# Patient Record
Sex: FEMALE | Race: WHITE | NOT HISPANIC OR LATINO | Employment: OTHER | URBAN - METROPOLITAN AREA
[De-identification: names, ages, dates, MRNs, and addresses within clinical notes are randomized per-mention and may not be internally consistent; named-entity substitution may affect disease eponyms.]

---

## 2017-09-11 ENCOUNTER — CONVERSION ENCOUNTER (OUTPATIENT)
Dept: GENERAL RADIOLOGY | Facility: HOSPITAL | Age: 62
End: 2017-09-11

## 2019-02-28 ENCOUNTER — HOSPITAL ENCOUNTER (OUTPATIENT)
Dept: GENERAL RADIOLOGY | Facility: HOSPITAL | Age: 64
Discharge: HOME | End: 2019-02-28
Attending: INTERNAL MEDICINE

## 2020-07-01 ENCOUNTER — HOSPITAL ENCOUNTER (OUTPATIENT)
Dept: GENERAL RADIOLOGY | Facility: HOSPITAL | Age: 65
Discharge: HOME | End: 2020-07-01
Attending: INTERNAL MEDICINE

## 2021-03-18 ENCOUNTER — HOSPITAL ENCOUNTER (OUTPATIENT)
Dept: VACCINE CLINIC | Facility: HOSPITAL | Age: 66
Discharge: HOME | End: 2021-03-18
Attending: INTERNAL MEDICINE

## 2021-04-08 ENCOUNTER — HOSPITAL ENCOUNTER (OUTPATIENT)
Dept: VACCINE CLINIC | Facility: HOSPITAL | Age: 66
Discharge: HOME | End: 2021-04-08
Attending: INTERNAL MEDICINE

## 2021-12-07 ENCOUNTER — OFFICE VISIT (OUTPATIENT)
Dept: FAMILY MEDICINE CLINIC | Facility: CLINIC | Age: 66
End: 2021-12-07

## 2021-12-07 VITALS
OXYGEN SATURATION: 98 % | BODY MASS INDEX: 34.06 KG/M2 | TEMPERATURE: 97.4 F | DIASTOLIC BLOOD PRESSURE: 78 MMHG | WEIGHT: 185.1 LBS | HEIGHT: 62 IN | HEART RATE: 67 BPM | SYSTOLIC BLOOD PRESSURE: 118 MMHG

## 2021-12-07 DIAGNOSIS — E78.5 HYPERLIPIDEMIA, UNSPECIFIED HYPERLIPIDEMIA TYPE: ICD-10-CM

## 2021-12-07 DIAGNOSIS — G89.29 CHRONIC PAIN OF BOTH KNEES: ICD-10-CM

## 2021-12-07 DIAGNOSIS — Z85.41 HISTORY OF CERVICAL CANCER: ICD-10-CM

## 2021-12-07 DIAGNOSIS — Z12.31 VISIT FOR SCREENING MAMMOGRAM: ICD-10-CM

## 2021-12-07 DIAGNOSIS — Z51.81 MEDICATION MONITORING ENCOUNTER: ICD-10-CM

## 2021-12-07 DIAGNOSIS — Z78.0 POSTMENOPAUSAL: ICD-10-CM

## 2021-12-07 DIAGNOSIS — M25.561 CHRONIC PAIN OF BOTH KNEES: ICD-10-CM

## 2021-12-07 DIAGNOSIS — Z23 NEED FOR INFLUENZA VACCINATION: ICD-10-CM

## 2021-12-07 DIAGNOSIS — I10 PRIMARY HYPERTENSION: Primary | ICD-10-CM

## 2021-12-07 DIAGNOSIS — M25.562 CHRONIC PAIN OF BOTH KNEES: ICD-10-CM

## 2021-12-07 PROBLEM — Z12.11 SCREENING FOR COLON CANCER: Status: ACTIVE | Noted: 2021-12-07

## 2021-12-07 LAB
ALBUMIN SERPL-MCNC: 4.8 G/DL (ref 3.5–5.2)
ALBUMIN/GLOB SERPL: 1.6 G/DL
ALP SERPL-CCNC: 98 U/L (ref 39–117)
ALT SERPL W P-5'-P-CCNC: 20 U/L (ref 1–33)
ANION GAP SERPL CALCULATED.3IONS-SCNC: 10.3 MMOL/L (ref 5–15)
AST SERPL-CCNC: 15 U/L (ref 1–32)
BASOPHILS # BLD AUTO: 0.05 10*3/MM3 (ref 0–0.2)
BASOPHILS NFR BLD AUTO: 0.5 % (ref 0–1.5)
BILIRUB SERPL-MCNC: 0.2 MG/DL (ref 0–1.2)
BUN SERPL-MCNC: 16 MG/DL (ref 8–23)
BUN/CREAT SERPL: 20.8 (ref 7–25)
CALCIUM SPEC-SCNC: 10.3 MG/DL (ref 8.6–10.5)
CHLORIDE SERPL-SCNC: 103 MMOL/L (ref 98–107)
CHOLEST SERPL-MCNC: 289 MG/DL (ref 0–200)
CO2 SERPL-SCNC: 27.7 MMOL/L (ref 22–29)
CREAT SERPL-MCNC: 0.77 MG/DL (ref 0.57–1)
DEPRECATED RDW RBC AUTO: 40.7 FL (ref 37–54)
EOSINOPHIL # BLD AUTO: 0.32 10*3/MM3 (ref 0–0.4)
EOSINOPHIL NFR BLD AUTO: 2.9 % (ref 0.3–6.2)
ERYTHROCYTE [DISTWIDTH] IN BLOOD BY AUTOMATED COUNT: 12.3 % (ref 12.3–15.4)
GFR SERPL CREATININE-BSD FRML MDRD: 75 ML/MIN/1.73
GLOBULIN UR ELPH-MCNC: 3 GM/DL
GLUCOSE SERPL-MCNC: 92 MG/DL (ref 65–99)
HCT VFR BLD AUTO: 37.6 % (ref 34–46.6)
HDLC SERPL-MCNC: 49 MG/DL (ref 40–60)
HGB BLD-MCNC: 12.8 G/DL (ref 12–15.9)
IMM GRANULOCYTES # BLD AUTO: 0.05 10*3/MM3 (ref 0–0.05)
IMM GRANULOCYTES NFR BLD AUTO: 0.5 % (ref 0–0.5)
LDLC SERPL CALC-MCNC: 194 MG/DL (ref 0–100)
LDLC/HDLC SERPL: 3.93 {RATIO}
LYMPHOCYTES # BLD AUTO: 1.96 10*3/MM3 (ref 0.7–3.1)
LYMPHOCYTES NFR BLD AUTO: 17.9 % (ref 19.6–45.3)
MCH RBC QN AUTO: 30.7 PG (ref 26.6–33)
MCHC RBC AUTO-ENTMCNC: 34 G/DL (ref 31.5–35.7)
MCV RBC AUTO: 90.2 FL (ref 79–97)
MONOCYTES # BLD AUTO: 0.89 10*3/MM3 (ref 0.1–0.9)
MONOCYTES NFR BLD AUTO: 8.1 % (ref 5–12)
NEUTROPHILS NFR BLD AUTO: 7.68 10*3/MM3 (ref 1.7–7)
NEUTROPHILS NFR BLD AUTO: 70.1 % (ref 42.7–76)
NRBC BLD AUTO-RTO: 0 /100 WBC (ref 0–0.2)
PLATELET # BLD AUTO: 430 10*3/MM3 (ref 140–450)
PMV BLD AUTO: 9.3 FL (ref 6–12)
POTASSIUM SERPL-SCNC: 4 MMOL/L (ref 3.5–5.2)
PROT SERPL-MCNC: 7.8 G/DL (ref 6–8.5)
RBC # BLD AUTO: 4.17 10*6/MM3 (ref 3.77–5.28)
SODIUM SERPL-SCNC: 141 MMOL/L (ref 136–145)
TRIGL SERPL-MCNC: 237 MG/DL (ref 0–150)
VLDLC SERPL-MCNC: 46 MG/DL (ref 5–40)
WBC NRBC COR # BLD: 10.95 10*3/MM3 (ref 3.4–10.8)

## 2021-12-07 PROCEDURE — 80061 LIPID PANEL: CPT | Performed by: FAMILY MEDICINE

## 2021-12-07 PROCEDURE — G0008 ADMIN INFLUENZA VIRUS VAC: HCPCS | Performed by: FAMILY MEDICINE

## 2021-12-07 PROCEDURE — 85025 COMPLETE CBC W/AUTO DIFF WBC: CPT | Performed by: FAMILY MEDICINE

## 2021-12-07 PROCEDURE — 99204 OFFICE O/P NEW MOD 45 MIN: CPT | Performed by: FAMILY MEDICINE

## 2021-12-07 PROCEDURE — 90662 IIV NO PRSV INCREASED AG IM: CPT | Performed by: FAMILY MEDICINE

## 2021-12-07 PROCEDURE — 80053 COMPREHEN METABOLIC PANEL: CPT | Performed by: FAMILY MEDICINE

## 2021-12-07 RX ORDER — LISINOPRIL AND HYDROCHLOROTHIAZIDE 12.5; 1 MG/1; MG/1
1 TABLET ORAL DAILY
Qty: 90 TABLET | Refills: 2 | Status: SHIPPED | OUTPATIENT
Start: 2021-12-07 | End: 2022-06-14 | Stop reason: SDUPTHER

## 2021-12-07 RX ORDER — LISINOPRIL AND HYDROCHLOROTHIAZIDE 12.5; 1 MG/1; MG/1
TABLET ORAL
COMMUNITY
End: 2021-12-07 | Stop reason: SDUPTHER

## 2021-12-07 RX ORDER — MULTIPLE VITAMINS W/ MINERALS TAB 9MG-400MCG
TAB ORAL
COMMUNITY

## 2021-12-07 RX ORDER — IBUPROFEN 200 MG
CAPSULE ORAL
COMMUNITY

## 2021-12-07 RX ORDER — SODIUM PHOSPHATE,MONO-DIBASIC 19G-7G/118
ENEMA (ML) RECTAL
COMMUNITY

## 2021-12-07 NOTE — PROGRESS NOTES
"Chief Complaint  Establish care  Hypertension  hld      Subjective          Pily Rice presents to [facility]  History of Present Illness  Patient presents today to establish care for myself.  She was previously seeing Dr. Shamir Justin.  I will request records.  She has been treated for hypertension over the past several years.  She reports being well controlled on lisinopril/hydrochlorothiazide 10/12.5 mg daily.  She does take calcium supplementation daily as well as glucosamine/conjoint and and a multivitamin.  He was diagnosed with cervical cancer 1988.  She reports having had a hysterectomy and oophorectomy.  She reports that she had a DEXA scan done about 10 years ago.  I discussed with her having a repeat done.  I do not see the records from her prior DEXA scan.  She is due for a mammogram.  Her last mammogram was in July 2020.  BI-RADS 1-benign.  She does report history of high cholesterol.  She was taken cholesterol medication at one point by her former primary care for about 6 months.  Discussed having labs reassess today.  She is requesting a flu vaccination today.  We did discuss Shingrix vaccination.  She reports having shingles about 2 years ago.  She would like to hold off on shingles vaccination at this time but may consider in the future.  Patient does have issues with bilateral knee pain.  This has been chronic for her for about a year.  Denies any recent injury.  She has not had any formal evaluation.  Objective   Vital Signs:   /78   Pulse 67   Temp 97.4 °F (36.3 °C)   Ht 157.5 cm (62\")   Wt 84 kg (185 lb 1.6 oz)   SpO2 98%   BMI 33.86 kg/m²     Physical Exam   General: AAO ×3, no acute distress, pleasant  HEENT: Normocephalic, atraumatic, no discharge in the eyes, no discharge from the nose, no oropharyngeal erythema or exudates, and TMs intact bilaterally with no erythema, no cervical tenderness or lymphadenopathy  Cardiovascular: Regular rate " and rhythm without appreciable murmur  Respiratory: Clear to auscultation bilaterally no RRW  Gastrointestinal: Soft nontender nondistended with bowel sounds present  Musculoskeletal: Both knees demonstrate negative valgus and varus stress testing, negative Saud, negative anterior posterior drawer testing.  She does have some crepitus with active and passive range of motion of her knee joints.  extremities: No edema  Neurologic: CN II through XII grossly intact   Psychiatric: Normal mood and affect  Result Review :                 Assessment and Plan    Diagnoses and all orders for this visit:    1. Primary hypertension (Primary)  -     CBC & Differential  -     Comprehensive Metabolic Panel    2. Medication monitoring encounter  -     CBC & Differential  -     Comprehensive Metabolic Panel  -     Lipid Panel    3. Need for influenza vaccination  -     Fluzone High-Dose 65+yrs (8213-0153)    4. Hyperlipidemia, unspecified hyperlipidemia type  -     Lipid Panel    5. Postmenopausal  -     DEXA Bone Density Axial; Future    6. Visit for screening mammogram  -     Mammo Screening Digital Tomosynthesis Bilateral With CAD; Future    7. History of cervical cancer    8. Chronic pain of both knees    Other orders  -     lisinopril-hydrochlorothiazide (PRINZIDE,ZESTORETIC) 10-12.5 MG per tablet; Take 1 tablet by mouth Daily.  Dispense: 90 tablet; Refill: 2    I discussed with patient getting labs done today.  We discussed continue current antihypertensive management.  Discussed checking lipid profile.  Discussed having a DEXA scan done as she did have her ovaries removed back in 1988.  She reports that she did see hematology/oncology for several years but reports that she had been stable and she no longer had to see hematology/oncology.  As far as her knees are concerned this has been a chronic issue for her.  She is not interested in further evaluation at this time.  I did suggest Voltaren gel which she can get  over-the-counter.  If symptoms continue to persist she is instructed to call or return.  She is considering making an appointment with orthopedics.  She states that her knees are not that bad at this time.  I have ordered a mammogram for her to get done.  Plan to see patient back in 6 months or sooner if needed.  She is instructed to call with any questions or concerns.    Follow Up   Return in about 6 months (around 6/7/2022) for hypertension.  Patient was given instructions and counseling regarding her condition or for health maintenance advice. Please see specific information pulled into the AVS if appropriate.

## 2022-02-07 ENCOUNTER — TELEPHONE (OUTPATIENT)
Dept: FAMILY MEDICINE CLINIC | Facility: CLINIC | Age: 67
End: 2022-02-07

## 2022-02-07 NOTE — TELEPHONE ENCOUNTER
LEFT MESSAGE THAT WE NEED TO CANCEL HER APPT ON 6/13, ASKED PT TO CALL THE OFFICE SO WE CAN RESCHEDULE HER APPT

## 2022-02-14 ENCOUNTER — HOSPITAL ENCOUNTER (OUTPATIENT)
Dept: BONE DENSITY | Facility: HOSPITAL | Age: 67
Discharge: HOME | End: 2022-02-14

## 2022-02-14 ENCOUNTER — HOSPITAL ENCOUNTER (OUTPATIENT)
Dept: MAMMOGRAPHY | Facility: HOSPITAL | Age: 67
Discharge: HOME | End: 2022-02-14

## 2022-02-14 DIAGNOSIS — Z78.0 POSTMENOPAUSAL: ICD-10-CM

## 2022-02-14 DIAGNOSIS — Z12.31 VISIT FOR SCREENING MAMMOGRAM: ICD-10-CM

## 2022-02-14 PROCEDURE — 77063 BREAST TOMOSYNTHESIS BI: CPT

## 2022-02-14 PROCEDURE — 77067 SCR MAMMO BI INCL CAD: CPT

## 2022-02-14 PROCEDURE — 77080 DXA BONE DENSITY AXIAL: CPT

## 2022-02-16 ENCOUNTER — TELEPHONE (OUTPATIENT)
Dept: FAMILY MEDICINE CLINIC | Facility: CLINIC | Age: 67
End: 2022-02-16

## 2022-02-16 NOTE — TELEPHONE ENCOUNTER
CALLED PT / NO ANSWER / LEFT MESSAGE THAT WE NEED TO RESCHEDULE HER APPT ON 6/13. ASKED FOR HER TO CALL THE OFFICE AND LEFT OUR NUMBER

## 2022-02-21 ENCOUNTER — TELEPHONE (OUTPATIENT)
Dept: FAMILY MEDICINE CLINIC | Facility: CLINIC | Age: 67
End: 2022-02-21

## 2022-06-14 ENCOUNTER — OFFICE VISIT (OUTPATIENT)
Dept: FAMILY MEDICINE CLINIC | Facility: CLINIC | Age: 67
End: 2022-06-14

## 2022-06-14 VITALS
WEIGHT: 180.7 LBS | OXYGEN SATURATION: 95 % | DIASTOLIC BLOOD PRESSURE: 72 MMHG | HEIGHT: 62 IN | TEMPERATURE: 98.1 F | HEART RATE: 86 BPM | SYSTOLIC BLOOD PRESSURE: 128 MMHG | BODY MASS INDEX: 33.25 KG/M2

## 2022-06-14 DIAGNOSIS — Z78.0 POSTMENOPAUSAL: ICD-10-CM

## 2022-06-14 DIAGNOSIS — I10 PRIMARY HYPERTENSION: Primary | ICD-10-CM

## 2022-06-14 DIAGNOSIS — Z51.81 MEDICATION MONITORING ENCOUNTER: ICD-10-CM

## 2022-06-14 DIAGNOSIS — E78.5 HYPERLIPIDEMIA, UNSPECIFIED HYPERLIPIDEMIA TYPE: ICD-10-CM

## 2022-06-14 DIAGNOSIS — M85.80 OSTEOPENIA, UNSPECIFIED LOCATION: ICD-10-CM

## 2022-06-14 PROCEDURE — 99214 OFFICE O/P EST MOD 30 MIN: CPT | Performed by: FAMILY MEDICINE

## 2022-06-14 RX ORDER — ATORVASTATIN CALCIUM 10 MG/1
10 TABLET, FILM COATED ORAL DAILY
Qty: 90 TABLET | Refills: 3 | Status: SHIPPED | OUTPATIENT
Start: 2022-06-14 | End: 2023-01-05 | Stop reason: SDUPTHER

## 2022-06-14 RX ORDER — LISINOPRIL AND HYDROCHLOROTHIAZIDE 12.5; 1 MG/1; MG/1
1 TABLET ORAL DAILY
Qty: 90 TABLET | Refills: 3 | Status: SHIPPED | OUTPATIENT
Start: 2022-06-14 | End: 2022-10-13 | Stop reason: SDUPTHER

## 2022-06-14 NOTE — PROGRESS NOTES
"Chief Complaint  Hypertension  Hyperlipidemia    Subjective        Pily Rice presents to [facility]  History of Present Illness  Patient presents today to follow-up for hypertension.  Blood pressure has been adequately controlled taking lisinopril/hydrochlorothiazide 10/12.5 mg daily.  We did review the labs from her last visit.  Her LDL is significantly elevated at 194.  We did discuss that per ACC/AHA calculator guidelines she does have an 11.2% risk of heart disease or stroke in the next 10 years.  We discussed a trial of atorvastatin to take at the 10 mg dosage.  She previously did have some muscle aches taking statins.  I did discuss with her that she may even consider taking the medication every other day until she is able to tolerate it better.  She is agreeable to doing a trial of that again.  We did discuss labs to be repeated prior to her next appointment.  She did have a DEXA scan done that showed osteopenia.  She is taking calcium and vitamin D supplementation.  Objective   Vital Signs:  /72   Pulse 86   Temp 98.1 °F (36.7 °C)   Ht 157.5 cm (62\")   Wt 82 kg (180 lb 11.2 oz)   SpO2 95%   BMI 33.05 kg/m²   Estimated body mass index is 33.05 kg/m² as calculated from the following:    Height as of this encounter: 157.5 cm (62\").    Weight as of this encounter: 82 kg (180 lb 11.2 oz).          Physical Exam   General: AAO ×3, no acute distress, pleasant  HEENT: Normocephalic, atraumatic  Cardiovascular: Regular rate and rhythm without appreciable murmur  Respiratory: Clear to auscultation bilaterally no RRW  Gastrointestinal: Soft nontender nondistended with bowel sounds present  extremities: No edema  Neurologic: CN II through XII grossly intact   Psychiatric: Normal mood and affect  Result Review :                Assessment and Plan   Diagnoses and all orders for this visit:    1. Primary hypertension (Primary)  -     CBC & Differential; Future  -     " Comprehensive Metabolic Panel; Future    2. Medication monitoring encounter  -     CBC & Differential; Future  -     Comprehensive Metabolic Panel; Future  -     Lipid Panel; Future    3. Hyperlipidemia, unspecified hyperlipidemia type  -     Lipid Panel; Future    4. Postmenopausal    5. Osteopenia, unspecified location    Other orders  -     lisinopril-hydrochlorothiazide (PRINZIDE,ZESTORETIC) 10-12.5 MG per tablet; Take 1 tablet by mouth Daily.  Dispense: 90 tablet; Refill: 3  -     atorvastatin (Lipitor) 10 MG tablet; Take 1 tablet by mouth Daily.  Dispense: 90 tablet; Refill: 3    Plan as documented above.  We discussed continue current management of hypertension.  We discussed starting atorvastatin for hyperlipidemia.  I discussed with her continuing current management for osteopenia with calcium and vitamin D supplementation.         Follow Up   Return in about 6 months (around 12/14/2022) for hypertension.  Patient was given instructions and counseling regarding her condition or for health maintenance advice. Please see specific information pulled into the AVS if appropriate.

## 2022-07-21 ENCOUNTER — OFFICE VISIT (OUTPATIENT)
Dept: ORTHOPEDIC SURGERY | Facility: CLINIC | Age: 67
End: 2022-07-21

## 2022-07-21 VITALS — HEIGHT: 62 IN | BODY MASS INDEX: 33.13 KG/M2 | WEIGHT: 180 LBS

## 2022-07-21 DIAGNOSIS — M25.561 PAIN IN BOTH KNEES, UNSPECIFIED CHRONICITY: Primary | ICD-10-CM

## 2022-07-21 DIAGNOSIS — M25.562 PAIN IN BOTH KNEES, UNSPECIFIED CHRONICITY: Primary | ICD-10-CM

## 2022-07-21 DIAGNOSIS — M17.0 PRIMARY OSTEOARTHRITIS OF BOTH KNEES: ICD-10-CM

## 2022-07-21 PROCEDURE — 99204 OFFICE O/P NEW MOD 45 MIN: CPT | Performed by: ORTHOPAEDIC SURGERY

## 2022-07-21 RX ORDER — MELOXICAM 15 MG/1
15 TABLET ORAL DAILY
Qty: 90 TABLET | Refills: 0 | Status: SHIPPED | OUTPATIENT
Start: 2022-07-21 | End: 2022-10-20

## 2022-07-21 NOTE — PROGRESS NOTES
"Chief Complaint  Initial Evaluation of the Right Knee and Initial Evaluation of the Left Knee     Subjective      Pily Rice presents to [facility] for an evaluation of bilateral knees. She reports pain in the knees since November of 2020, it started in the left knee. She had swelling and pain during this time. Her right knee followed shortly after, she does not have much swelling in the right knee. She states a week ago she had a lot of swelling. She denies any injury or trauma to the knees.     No Known Allergies     Social History     Socioeconomic History   • Marital status:    Tobacco Use   • Smoking status: Never Smoker   • Smokeless tobacco: Never Used   Vaping Use   • Vaping Use: Never used   Substance and Sexual Activity   • Alcohol use: Never   • Drug use: Never        Review of Systems     Objective   Vital Signs:   Ht 157.5 cm (62\")   Wt 81.6 kg (180 lb)   BMI 32.92 kg/m²       Physical Exam  Constitutional:       Appearance: Normal appearance. Patient is well-developed and normal weight.   HENT:      Head: Normocephalic.      Right Ear: Hearing and external ear normal.      Left Ear: Hearing and external ear normal.      Nose: Nose normal.   Eyes:      Conjunctiva/sclera: Conjunctivae normal.   Cardiovascular:      Rate and Rhythm: Normal rate.   Pulmonary:      Effort: Pulmonary effort is normal.      Breath sounds: No wheezing or rales.   Abdominal:      Palpations: Abdomen is soft.      Tenderness: There is no abdominal tenderness.   Musculoskeletal:      Cervical back: Normal range of motion.   Skin:     Findings: No rash.   Neurological:      Mental Status: Patient is alert and oriented to person, place, and time.   Psychiatric:         Mood and Affect: Mood and affect normal.         Judgment: Judgment normal.       Ortho Exam      RIGHT KNEE: Calf supple, non-tender, no signs of DVT. Dorsal Pedal Pulse 2+, posterior tibialis pulse 2+. Tender medial joint " line. No swelling, skin discoloration or atrophy. Stable to varus/valgus stress. Stable anterior and posterior drawer. Negative Lachman. Skin intact. Flexion to 125 degrees. Full extension.     LEFT KNEE: Good strength to hamstrings, quadriceps, dorsiflexors and plantar flexors. Stable to varus/valgus stress. Stable anterior and posterior drawer. Negative Lachman. Sensation grossly intact. Neurovascular intact.  Dorsal Pedal Pulse 2+, posterior tibialis pulse 2+. Calf soft. Tender medial joint line. Full extension. Flexion to 125 degrees.     Procedures      Imaging Results (Most Recent)     Procedure Component Value Units Date/Time    XR Knee 3 View Left [[id number]] Resulted: 07/21/22 1604     Updated: 07/21/22 1604    XR Knee 3 View Right [[id number]] Resulted: 07/21/22 1604     Updated: 07/21/22 1604           Result Review :     X-Ray Report:  Bilateral knee(s) X-Ray  Indication: Evaluation of bilateral knee pain   AP, Lateral and Standing view(s)  Findings: advancing degenerative changes of bilateral knees, no acute fractures or dislocation. Bilateral lateral tracking patella.   Prior studies available for comparison: no     Assessment and Plan     Diagnoses and all orders for this visit:    1. Pain in both knees, unspecified chronicity (Primary)  -     XR Knee 3 View Left  -     XR Knee 3 View Right    2. Primary osteoarthritis of both knees      At home exercises provided for the patient. Her pain has been improving, will consider an injection in the future. Anti-inflammatory prescribed.     Therapy written in case she wants to go in the future.     Call or return if worsening symptoms.    Follow Up     PRN.       Patient was given instructions and counseling regarding her condition or for health maintenance advice. Please see specific information pulled into the AVS if appropriate.     Scribed for Mani Wayne MD by Kelle Hartman.  07/21/22   16:03 EDT    I have personally performed the services  described in this document as scribed by the above individual and it is both accurate and complete. Mani Wayne MD 07/21/22

## 2022-08-11 ENCOUNTER — TREATMENT (OUTPATIENT)
Dept: PHYSICAL THERAPY | Facility: CLINIC | Age: 67
End: 2022-08-11

## 2022-08-11 DIAGNOSIS — M25.561 CHRONIC PAIN OF BOTH KNEES: Primary | ICD-10-CM

## 2022-08-11 DIAGNOSIS — G89.29 CHRONIC PAIN OF BOTH KNEES: Primary | ICD-10-CM

## 2022-08-11 DIAGNOSIS — M62.81 MUSCLE WEAKNESS OF LOWER EXTREMITY: ICD-10-CM

## 2022-08-11 DIAGNOSIS — M25.562 CHRONIC PAIN OF BOTH KNEES: Primary | ICD-10-CM

## 2022-08-11 DIAGNOSIS — R26.9 GAIT DISTURBANCE: ICD-10-CM

## 2022-08-11 PROCEDURE — 97161 PT EVAL LOW COMPLEX 20 MIN: CPT | Performed by: PHYSICAL THERAPIST

## 2022-08-11 PROCEDURE — 97110 THERAPEUTIC EXERCISES: CPT | Performed by: PHYSICAL THERAPIST

## 2022-08-11 NOTE — PROGRESS NOTES
Physical Therapy Initial Evaluation and Plan of Care    Patient: Pily Rice   : 1955  Diagnosis/ICD-10 Code:  Chronic pain of both knees [M25.561, M25.562, G89.29]  Referring practitioner: Mani Wayne MD  Date of Initial Visit: 2022  Today's Date: 2022  Patient seen for 1 sessions           Subjective Questionnaire: LEFS: 48/80 = 40% limitation      Subjective Evaluation    History of Present Illness  Mechanism of injury: Started 2020, pain and swelling in left knee. Then the right knee started hurting at a later time, they go back and forth, but depends on the day. X-rays show arthritis. Right medial knee pain that got severe, ortho appt made. Stiffness noted after sitting for a long period. Started taking meloxicam. She is overall feeling better. She is retired. Lives with her  in a two story home with a basement with washer/dryer.     Pain  Current pain ratin  At best pain ratin  At worst pain ratin  Quality: dull ache and sharp  Relieving factors: change in position and rest  Aggravating factors: ambulation, stairs and standing    Patient Goals  Patient goals for therapy: decreased pain, improved balance, increased motion, increased strength and independence with ADLs/IADLs             Objective          Tenderness     Right Knee   Tenderness in the medial retinaculum and patellar tendon.     Active Range of Motion   Left Hip   Normal active range of motion    Right Hip   Normal active range of motion  Left Knee   Flexion: 130 degrees   Extension: 0 degrees     Right Knee   Flexion: 128 degrees   Extension: 0 degrees     Patellar Static Positioning   Left Knee: lateral tilt  Right Knee: lateral tilt    Strength/Myotome Testing     Left Hip   Planes of Motion   Flexion: 4+  Extension: 4-  Abduction: 4-  Adduction: 4    Right Hip   Planes of Motion   Flexion: 4+  Extension: 4-  Abduction: 4-  Adduction: 4    Left Knee   Flexion: 4  Extension: 4    Right Knee    Flexion: 4  Extension: 4    Left Ankle/Foot   Dorsiflexion: 5  Plantar flexion: 4    Right Ankle/Foot   Dorsiflexion: 5  Plantar flexion: 4    Ambulation     Comments   Bilateral knee valgus, Right calcaneal valgus, mild antalgia on the RLE      See Exercise, Manual, and Modality Logs for complete treatment.     Assessment & Plan     Assessment  Impairments: abnormal gait, abnormal muscle firing, abnormal or restricted ROM, activity intolerance, impaired balance, impaired physical strength, pain with function and weight-bearing intolerance  Functional Limitations: walking, standing and stooping  Assessment details: The patient presents to physical therapy with complaints of bilateral knee pain with s/s of arthritis. The patient presents with associated knee weakness, stiffness, antalgic gait, and functional deficits (LEFS). The patient would benefit from skilled PT intervention to address the above mentioned functional limitations.     Prognosis: good    Goals  Plan Goals: 1. Mobility: Walking/Moving Around Functional Limitation     LTG 1: 12 weeks:  The patient will demonstrate a score of 55/80 on the LEFS.    STATUS:  New   STG 1 a: 6 weeks:  The patient will demonstrate a score of 50/80 on the LEFS.      STATUS:  New   TREATMENT:  Manual therapy, therapeutic exercise, home exercise instruction, and modalities as needed to include: moist heat, electrical stimulation, and ultrasound.    2. The patient has limited strength of the B knee.   LTG 2: 12 weeks: The patient will demonstrate 5 /5 strength for knee flexion and extension in order to allow patient improved joint stability    STATUS:  New   STG 2a: 6 weeks: The patient will demonstrate 4+ /5 strength for knee flexion and extension    STATUS:  New    TREATMENT: Manual therapy, therapeutic exercise, home exercise instruction, aquatic therapy, and modalities as needed to include:  moist heat, electrical stimulation, ultrasound, and ice.     3. The patient has  gait dysfunction.   LTG 3: 12 weeks:  The patient will ambulate without assistive device, independently, for community distances with normal biomechanics of the Right lower extremity in order to improve mobility and allow patient to perform activities such as grocery shopping with greater ease.    STATUS:  New   STG 3a: 4 weeks: The patient will ambulate ascending stairs reciprocally with minimal pain.    STATUS:  New   TREATMENT: Gait training, aquatic therapy, therapeutic exercise, and home exercise instruction.           Plan  Therapy options: will be seen for skilled therapy services  Planned modality interventions: TENS, cryotherapy and thermotherapy (hydrocollator packs)  Planned therapy interventions: functional ROM exercises, gait training, home exercise program, manual therapy, strengthening, stretching, therapeutic activities, soft tissue mobilization, joint mobilization, neuromuscular re-education, balance/weight-bearing training and transfer training  Other planned therapy interventions: aquatic therapy  Frequency: 3x week  Duration in weeks: 12  Treatment plan discussed with: patient        Visit Diagnoses:    ICD-10-CM ICD-9-CM   1. Chronic pain of both knees  M25.561 719.46    M25.562 338.29    G89.29    2. Muscle weakness of lower extremity  M62.81 728.87   3. Gait disturbance  R26.9 781.2       History # of Personal Factors and/or Comorbidities: MODERATE (1-2)  Examination of Body System(s): # of elements: LOW (1-2)  Clinical Presentation: STABLE   Clinical Decision Making: LOW       Timed:         Manual Therapy:    0     mins  41317;     Therapeutic Exercise:    15     mins  43591;     Neuromuscular Jerry:    0    mins  74863;    Therapeutic Activity:     0     mins  53758;     Gait Trainin     mins  30306;     Ultrasound:     0     mins  44494;    Ionto                               0    mins   72935  Self Care                       0     mins   59629  Canalith Repos    0     mins  91378      Un-Timed:  Electrical Stimulation:    0     mins  11768 ( );  Dry Needling     0     mins self-pay  Traction     0     mins 99955  Low Eval     25     Mins  45566  Mod Eval     0     Mins  77888  High Eval                       0     Mins  59390  Re-Eval                           0    mins  63963    Timed Treatment:   15   mins   Total Treatment:     40   mins    PT SIGNATURE: Kirk Walter, PT     Electronically signed 8/11/2022    [place] License: PT - [id number]     Initial Certification  Certification Period: 8/11/2022 thru 11/8/2022  I certify that the therapy services are furnished while this patient is under my care.  The services outlined above are required by this patient, and will be reviewed every 90 days.     PHYSICIAN: Mani Wayne MD   NPI: [id number]                                        DATE:     Please sign and return via fax to [phone]. Thank you, [facility].

## 2022-10-13 RX ORDER — LISINOPRIL AND HYDROCHLOROTHIAZIDE 12.5; 1 MG/1; MG/1
1 TABLET ORAL DAILY
Qty: 90 TABLET | Refills: 3 | Status: SHIPPED | OUTPATIENT
Start: 2022-10-13 | End: 2023-01-05 | Stop reason: SDUPTHER

## 2022-10-13 NOTE — TELEPHONE ENCOUNTER
Caller: Pily Rice    Relationship: Self    Best call back number: [phone]    Requested Prescriptions:   Requested Prescriptions     Pending Prescriptions Disp Refills   • lisinopril-hydrochlorothiazide (PRINZIDE,ZESTORETIC) 10-12.5 MG per tablet 90 tablet 3     Sig: Take 1 tablet by mouth Daily.        Pharmacy where request should be sent: [id number] [place] [place] [facility] [address] [phone] [facility] [phone] FX     Additional details provided by patient: TOOK LAST PILL THIS MORNING    Does the patient have less than a 3 day supply:  [x] Yes  [] No    Andrey Gee Rep   10/13/22 10:02 EDT

## 2022-10-20 DIAGNOSIS — M25.561 PAIN IN BOTH KNEES, UNSPECIFIED CHRONICITY: ICD-10-CM

## 2022-10-20 DIAGNOSIS — M25.562 PAIN IN BOTH KNEES, UNSPECIFIED CHRONICITY: ICD-10-CM

## 2022-10-20 DIAGNOSIS — M17.0 PRIMARY OSTEOARTHRITIS OF BOTH KNEES: ICD-10-CM

## 2022-10-20 RX ORDER — MELOXICAM 15 MG/1
TABLET ORAL
Qty: 90 TABLET | Refills: 0 | Status: SHIPPED | OUTPATIENT
Start: 2022-10-20 | End: 2023-01-23

## 2023-01-05 ENCOUNTER — TELEPHONE (OUTPATIENT)
Dept: FAMILY MEDICINE CLINIC | Facility: CLINIC | Age: 68
End: 2023-01-05

## 2023-01-05 ENCOUNTER — OFFICE VISIT (OUTPATIENT)
Dept: FAMILY MEDICINE CLINIC | Facility: CLINIC | Age: 68
End: 2023-01-05
Payer: MEDICARE

## 2023-01-05 VITALS
HEIGHT: 62 IN | DIASTOLIC BLOOD PRESSURE: 70 MMHG | BODY MASS INDEX: 34.47 KG/M2 | TEMPERATURE: 98.1 F | SYSTOLIC BLOOD PRESSURE: 124 MMHG | WEIGHT: 187.3 LBS | HEART RATE: 68 BPM | OXYGEN SATURATION: 97 %

## 2023-01-05 DIAGNOSIS — E78.2 MIXED HYPERLIPIDEMIA: ICD-10-CM

## 2023-01-05 DIAGNOSIS — Z51.81 MEDICATION MONITORING ENCOUNTER: ICD-10-CM

## 2023-01-05 DIAGNOSIS — I10 PRIMARY HYPERTENSION: Primary | ICD-10-CM

## 2023-01-05 DIAGNOSIS — Z12.31 VISIT FOR SCREENING MAMMOGRAM: ICD-10-CM

## 2023-01-05 PROCEDURE — 99214 OFFICE O/P EST MOD 30 MIN: CPT | Performed by: FAMILY MEDICINE

## 2023-01-05 RX ORDER — ATORVASTATIN CALCIUM 10 MG/1
10 TABLET, FILM COATED ORAL DAILY
Qty: 90 TABLET | Refills: 3 | Status: SHIPPED | OUTPATIENT
Start: 2023-01-05

## 2023-01-05 RX ORDER — LISINOPRIL AND HYDROCHLOROTHIAZIDE 12.5; 1 MG/1; MG/1
1 TABLET ORAL DAILY
Qty: 90 TABLET | Refills: 3 | Status: SHIPPED | OUTPATIENT
Start: 2023-01-05

## 2023-01-05 NOTE — TELEPHONE ENCOUNTER
Phone call placed to patient with inability to leave a message.Will take paperwork up front for .

## 2023-01-05 NOTE — PROGRESS NOTES
Chief Complaint  Follow-up    Subjective        Pily Rice presents to [facility]  History of Present Illness  Patient presents today to follow-up for hypertension hypercholesterolemia.  She is due for labs but will return fasting to have these done.  Her last lipid panel showed her LDL at 194.  She is currently taking atorvastatin 10 mg daily.  I discussed with her continuing current management at this time.  She has started her atorvastatin since June.  She will be due for a mammogram this February.  Blood pressure has been adequately controlled.  She is currently taking lisinopril/hydrochlorothiazide 10/12.5 mg daily.  Objective   Vital Signs:  /70   Pulse 68   Temp 98.1 °F (36.7 °C)   Ht 157.5 cm (62\")   Wt 85 kg (187 lb 4.8 oz)   SpO2 97%   BMI 34.26 kg/m²   Estimated body mass index is 34.26 kg/m² as calculated from the following:    Height as of this encounter: 157.5 cm (62\").    Weight as of this encounter: 85 kg (187 lb 4.8 oz).          Physical Exam   General: AAO ×3, no acute distress, pleasant  HEENT: Normocephalic, atraumatic  Cardiovascular: Regular rate and rhythm without appreciable murmur  Respiratory: Clear to auscultation bilaterally no RRW  Gastrointestinal: Soft nontender nondistended with bowel sounds present  extremities: No edema  Neurologic: CN II through XII grossly intact   Psychiatric: Normal mood and affect  Result Review :                Assessment and Plan   Diagnoses and all orders for this visit:    1. Primary hypertension (Primary)    2. Medication monitoring encounter    3. Visit for screening mammogram  -     Mammo Screening Digital Tomosynthesis Bilateral With CAD; Future    4. Mixed hyperlipidemia    Other orders  -     lisinopril-hydrochlorothiazide (PRINZIDE,ZESTORETIC) 10-12.5 MG per tablet; Take 1 tablet by mouth Daily.  Dispense: 90 tablet; Refill: 3  -     atorvastatin (Lipitor) 10 MG tablet; Take 1 tablet by mouth Daily.   Dispense: 90 tablet; Refill: 3    I discussed with patient continue current management for hypertension and hyperlipidemia.  Further recommendations to follow once her labs return.  Mammogram has been ordered.  I plan to see her back in 1 year or sooner if needed.         Follow Up   Return in about 1 year (around 1/5/2024) for hypertension.  Patient was given instructions and counseling regarding her condition or for health maintenance advice. Please see specific information pulled into the AVS if appropriate.

## 2023-01-22 DIAGNOSIS — M25.561 PAIN IN BOTH KNEES, UNSPECIFIED CHRONICITY: ICD-10-CM

## 2023-01-22 DIAGNOSIS — M25.562 PAIN IN BOTH KNEES, UNSPECIFIED CHRONICITY: ICD-10-CM

## 2023-01-22 DIAGNOSIS — M17.0 PRIMARY OSTEOARTHRITIS OF BOTH KNEES: ICD-10-CM

## 2023-01-23 RX ORDER — MELOXICAM 15 MG/1
TABLET ORAL
Qty: 90 TABLET | Refills: 0 | Status: SHIPPED | OUTPATIENT
Start: 2023-01-23

## 2023-04-07 ENCOUNTER — HOSPITAL ENCOUNTER (OUTPATIENT)
Dept: MAMMOGRAPHY | Facility: HOSPITAL | Age: 68
Discharge: HOME | End: 2023-04-07
Admitting: FAMILY MEDICINE
Payer: MEDICARE

## 2023-04-07 DIAGNOSIS — Z12.31 VISIT FOR SCREENING MAMMOGRAM: ICD-10-CM

## 2023-04-07 PROCEDURE — 77063 BREAST TOMOSYNTHESIS BI: CPT

## 2023-04-07 PROCEDURE — 77067 SCR MAMMO BI INCL CAD: CPT

## 2023-04-22 DIAGNOSIS — M25.561 PAIN IN BOTH KNEES, UNSPECIFIED CHRONICITY: ICD-10-CM

## 2023-04-22 DIAGNOSIS — M17.0 PRIMARY OSTEOARTHRITIS OF BOTH KNEES: ICD-10-CM

## 2023-04-22 DIAGNOSIS — M25.562 PAIN IN BOTH KNEES, UNSPECIFIED CHRONICITY: ICD-10-CM

## 2023-04-24 RX ORDER — MELOXICAM 15 MG/1
TABLET ORAL
Qty: 90 TABLET | Refills: 0 | Status: SHIPPED | OUTPATIENT
Start: 2023-04-24

## 2023-10-16 DIAGNOSIS — M25.561 PAIN IN BOTH KNEES, UNSPECIFIED CHRONICITY: ICD-10-CM

## 2023-10-16 DIAGNOSIS — M17.0 PRIMARY OSTEOARTHRITIS OF BOTH KNEES: ICD-10-CM

## 2023-10-16 DIAGNOSIS — M25.562 PAIN IN BOTH KNEES, UNSPECIFIED CHRONICITY: ICD-10-CM

## 2023-10-16 RX ORDER — MELOXICAM 15 MG/1
TABLET ORAL
Qty: 90 TABLET | Refills: 0 | Status: SHIPPED | OUTPATIENT
Start: 2023-10-16

## 2023-10-30 ENCOUNTER — HOSPITAL ENCOUNTER (OUTPATIENT)
Dept: GENERAL RADIOLOGY | Facility: HOSPITAL | Age: 68
Discharge: HOME | End: 2023-10-30
Payer: MEDICARE

## 2023-10-30 ENCOUNTER — OFFICE VISIT (OUTPATIENT)
Dept: FAMILY MEDICINE CLINIC | Facility: CLINIC | Age: 68
End: 2023-10-30
Payer: MEDICARE

## 2023-10-30 VITALS
OXYGEN SATURATION: 97 % | DIASTOLIC BLOOD PRESSURE: 84 MMHG | HEART RATE: 63 BPM | SYSTOLIC BLOOD PRESSURE: 124 MMHG | TEMPERATURE: 97.8 F | WEIGHT: 185.4 LBS | HEIGHT: 62 IN | BODY MASS INDEX: 34.12 KG/M2

## 2023-10-30 DIAGNOSIS — R10.13 EPIGASTRIC PAIN: ICD-10-CM

## 2023-10-30 DIAGNOSIS — R10.9 FLANK PAIN: Primary | ICD-10-CM

## 2023-10-30 LAB
BILIRUB BLD-MCNC: NEGATIVE MG/DL
CLARITY, POC: ABNORMAL
COLOR UR: ABNORMAL
GLUCOSE UR STRIP-MCNC: NEGATIVE MG/DL
KETONES UR QL: NEGATIVE
LEUKOCYTE EST, POC: ABNORMAL
NITRITE UR-MCNC: POSITIVE MG/ML
PH UR: 5.5 [PH] (ref 5–8)
PROT UR STRIP-MCNC: ABNORMAL MG/DL
RBC # UR STRIP: ABNORMAL /UL
SP GR UR: 1.02 (ref 1–1.03)
UROBILINOGEN UR QL: ABNORMAL

## 2023-10-30 PROCEDURE — 87086 URINE CULTURE/COLONY COUNT: CPT

## 2023-10-30 PROCEDURE — 74018 RADEX ABDOMEN 1 VIEW: CPT

## 2023-10-30 PROCEDURE — 87186 SC STD MICRODIL/AGAR DIL: CPT

## 2023-10-30 PROCEDURE — 87088 URINE BACTERIA CULTURE: CPT

## 2023-10-30 RX ORDER — NITROFURANTOIN 25; 75 MG/1; MG/1
100 CAPSULE ORAL 2 TIMES DAILY
Qty: 14 CAPSULE | Refills: 0 | Status: SHIPPED | OUTPATIENT
Start: 2023-10-30 | End: 2023-11-06

## 2023-10-31 RX ORDER — CEPHALEXIN 500 MG/1
500 CAPSULE ORAL 2 TIMES DAILY
Qty: 14 CAPSULE | Refills: 0 | Status: SHIPPED | OUTPATIENT
Start: 2023-10-31 | End: 2023-11-07

## 2023-10-31 NOTE — PROGRESS NOTES
There is a moderate pancolonic stool burden.  There are surgical   clips within the mid abdomen and left pelvis.  I would recommend miralax to help with emptying of stool.

## 2023-10-31 NOTE — PROGRESS NOTES
The urine culture showed growth of E. coli in urine.  I had sent in Macrobid for her to help with symptoms.  Ensure that she is doing well on this medicine for me please

## 2023-11-01 LAB — BACTERIA SPEC AEROBE CULT: ABNORMAL

## 2024-01-08 ENCOUNTER — OFFICE VISIT (OUTPATIENT)
Dept: FAMILY MEDICINE CLINIC | Facility: CLINIC | Age: 69
End: 2024-01-08
Payer: MEDICARE

## 2024-01-08 VITALS
DIASTOLIC BLOOD PRESSURE: 80 MMHG | WEIGHT: 182.6 LBS | HEART RATE: 81 BPM | TEMPERATURE: 98.4 F | BODY MASS INDEX: 33.6 KG/M2 | OXYGEN SATURATION: 99 % | SYSTOLIC BLOOD PRESSURE: 124 MMHG | HEIGHT: 62 IN

## 2024-01-08 DIAGNOSIS — Z12.11 SCREENING FOR COLON CANCER: ICD-10-CM

## 2024-01-08 DIAGNOSIS — E78.2 MIXED HYPERLIPIDEMIA: ICD-10-CM

## 2024-01-08 DIAGNOSIS — Z12.31 VISIT FOR SCREENING MAMMOGRAM: ICD-10-CM

## 2024-01-08 DIAGNOSIS — I10 PRIMARY HYPERTENSION: Primary | ICD-10-CM

## 2024-01-08 DIAGNOSIS — Z11.59 NEED FOR HEPATITIS C SCREENING TEST: ICD-10-CM

## 2024-01-08 PROCEDURE — 99214 OFFICE O/P EST MOD 30 MIN: CPT | Performed by: FAMILY MEDICINE

## 2024-01-08 RX ORDER — LISINOPRIL AND HYDROCHLOROTHIAZIDE 12.5; 1 MG/1; MG/1
1 TABLET ORAL DAILY
Qty: 90 TABLET | Refills: 3 | Status: SHIPPED | OUTPATIENT
Start: 2024-01-08

## 2024-01-08 RX ORDER — ATORVASTATIN CALCIUM 10 MG/1
10 TABLET, FILM COATED ORAL DAILY
Qty: 90 TABLET | Refills: 3 | Status: SHIPPED | OUTPATIENT
Start: 2024-01-08

## 2024-01-08 NOTE — PROGRESS NOTES
"Chief Complaint  Hypertension (1 year follow up )    Subjective        Pily Rice presents to [facility]  History of Present Illness  Patient presents today to follow-up for hypertension.  Blood pressure is adequately controlled today.  She does not check her blood pressure regularly.  She is currently taking lisinopril/hydrochlorothiazide 10/12.5 mg daily.  She does take atorvastatin for hyperlipidemia.  She is requesting refills on both medications.  She is due for labs and encouraged to get these done at her convenience.  She does report having had labs done last year although I do not see any record of this.  I do see  labs from .  She previously had a urinary tract infection back in October.  She is doing better now.  She does report being treated with an antibiotic.  She was treated with Keflex.  She also had an x-ray done that showed moderate colonic stool burden.  She denies any issues with constipation.  I did discuss with her that should she have any recurrence of symptoms to let us know.  She denies any symptoms of urinary tract infection.  She is not due for colonoscopy until 10 years from her last 1 which will be in .  Her last 1 was done on 9/15/2017.  She is due for mammogram.  Objective   Vital Signs:  /80   Pulse 81   Temp 98.4 °F (36.9 °C)   Ht 157.5 cm (62\")   Wt 82.8 kg (182 lb 9.6 oz)   SpO2 99%   BMI 33.40 kg/m²   Estimated body mass index is 33.4 kg/m² as calculated from the following:    Height as of this encounter: 157.5 cm (62\").    Weight as of this encounter: 82.8 kg (182 lb 9.6 oz).       BMI is >= 30 and <35. (Class 1 Obesity). The following options were offered after discussion;: exercise counseling/recommendations and nutrition counseling/recommendations      Physical Exam  Vitals reviewed.   Constitutional:       Appearance: Normal appearance.   HENT:      Head: Normocephalic and atraumatic.      Right Ear: External ear " normal.      Left Ear: External ear normal.      Nose: Nose normal.   Eyes:      Conjunctiva/sclera: Conjunctivae normal.   Cardiovascular:      Rate and Rhythm: Normal rate and regular rhythm.      Heart sounds: No murmur heard.     No friction rub. No gallop.   Pulmonary:      Effort: Pulmonary effort is normal.      Breath sounds: Normal breath sounds. No wheezing or rhonchi.   Abdominal:      General: Bowel sounds are normal. There is no distension.      Palpations: Abdomen is soft.      Tenderness: There is no abdominal tenderness.   Skin:     General: Skin is warm and dry.   Neurological:      Mental Status: She is alert and oriented to person, place, and time.      Cranial Nerves: No cranial nerve deficit.   Psychiatric:         Mood and Affect: Mood and affect normal.         Behavior: Behavior normal.         Thought Content: Thought content normal.         Judgment: Judgment normal.        Result Review :                   Assessment and Plan   Diagnoses and all orders for this visit:    1. Primary hypertension (Primary)  -     CBC & Differential; Future  -     Comprehensive Metabolic Panel; Future    2. Mixed hyperlipidemia  -     Lipid Panel; Future    3. Need for hepatitis C screening test  -     Hepatitis C Antibody; Future    4. Screening for colon cancer    5. Visit for screening mammogram  -     Mammo Screening Digital Tomosynthesis Bilateral With CAD; Future    Other orders  -     lisinopril-hydrochlorothiazide (PRINZIDE,ZESTORETIC) 10-12.5 MG per tablet; Take 1 tablet by mouth Daily.  Dispense: 90 tablet; Refill: 3  -     atorvastatin (Lipitor) 10 MG tablet; Take 1 tablet by mouth Daily.  Dispense: 90 tablet; Refill: 3    I discussed with patient continuing current treatment and management for hypertension and hyperlipidemia.  Plan to get her labs updated with plan for reassessment at that time with further recommendations to follow also.  Her last LDL was at 194.  She does report compliance with  her medication.  She has been on atorvastatin since 6/14/2022.  Mammogram has also been ordered.  Her last mammogram was on 4/7/2023 which was normal, BI-RADS 2.         Follow Up   Return in about 1 year (around 1/8/2025) for Hypertension.  Patient was given instructions and counseling regarding her condition or for health maintenance advice. Please see specific information pulled into the AVS if appropriate.

## 2024-01-10 DIAGNOSIS — M25.562 PAIN IN BOTH KNEES, UNSPECIFIED CHRONICITY: ICD-10-CM

## 2024-01-10 DIAGNOSIS — M17.0 PRIMARY OSTEOARTHRITIS OF BOTH KNEES: ICD-10-CM

## 2024-01-10 DIAGNOSIS — M25.561 PAIN IN BOTH KNEES, UNSPECIFIED CHRONICITY: ICD-10-CM

## 2024-01-10 RX ORDER — MELOXICAM 15 MG/1
TABLET ORAL
Qty: 90 TABLET | Refills: 0 | Status: SHIPPED | OUTPATIENT
Start: 2024-01-10

## 2024-01-12 ENCOUNTER — LAB (OUTPATIENT)
Dept: LAB | Facility: HOSPITAL | Age: 69
End: 2024-01-12
Payer: MEDICARE

## 2024-01-12 DIAGNOSIS — E78.2 MIXED HYPERLIPIDEMIA: ICD-10-CM

## 2024-01-12 DIAGNOSIS — I10 PRIMARY HYPERTENSION: ICD-10-CM

## 2024-01-12 DIAGNOSIS — Z11.59 NEED FOR HEPATITIS C SCREENING TEST: ICD-10-CM

## 2024-01-12 LAB
ALBUMIN SERPL-MCNC: 4.7 G/DL (ref 3.5–5.2)
ALBUMIN/GLOB SERPL: 1.7 G/DL
ALP SERPL-CCNC: 89 U/L (ref 39–117)
ALT SERPL W P-5'-P-CCNC: 16 U/L (ref 1–33)
ANION GAP SERPL CALCULATED.3IONS-SCNC: 14.6 MMOL/L (ref 5–15)
AST SERPL-CCNC: 22 U/L (ref 1–32)
BASOPHILS # BLD AUTO: 0.05 10*3/MM3 (ref 0–0.2)
BASOPHILS NFR BLD AUTO: 0.7 % (ref 0–1.5)
BILIRUB SERPL-MCNC: <0.2 MG/DL (ref 0–1.2)
BUN SERPL-MCNC: 29 MG/DL (ref 8–23)
BUN/CREAT SERPL: 24.8 (ref 7–25)
CALCIUM SPEC-SCNC: 10.3 MG/DL (ref 8.6–10.5)
CHLORIDE SERPL-SCNC: 104 MMOL/L (ref 98–107)
CHOLEST SERPL-MCNC: 174 MG/DL (ref 0–200)
CO2 SERPL-SCNC: 24.4 MMOL/L (ref 22–29)
CREAT SERPL-MCNC: 1.17 MG/DL (ref 0.57–1)
DEPRECATED RDW RBC AUTO: 40.7 FL (ref 37–54)
EGFRCR SERPLBLD CKD-EPI 2021: 50.6 ML/MIN/1.73
EOSINOPHIL # BLD AUTO: 0.35 10*3/MM3 (ref 0–0.4)
EOSINOPHIL NFR BLD AUTO: 4.6 % (ref 0.3–6.2)
ERYTHROCYTE [DISTWIDTH] IN BLOOD BY AUTOMATED COUNT: 12.1 % (ref 12.3–15.4)
GLOBULIN UR ELPH-MCNC: 2.7 GM/DL
GLUCOSE SERPL-MCNC: 109 MG/DL (ref 65–99)
HCT VFR BLD AUTO: 38.7 % (ref 34–46.6)
HCV AB SER DONR QL: NORMAL
HDLC SERPL-MCNC: 48 MG/DL (ref 40–60)
HGB BLD-MCNC: 12.6 G/DL (ref 12–15.9)
IMM GRANULOCYTES # BLD AUTO: 0.05 10*3/MM3 (ref 0–0.05)
IMM GRANULOCYTES NFR BLD AUTO: 0.7 % (ref 0–0.5)
LDLC SERPL CALC-MCNC: 97 MG/DL (ref 0–100)
LDLC/HDLC SERPL: 1.93 {RATIO}
LYMPHOCYTES # BLD AUTO: 1.96 10*3/MM3 (ref 0.7–3.1)
LYMPHOCYTES NFR BLD AUTO: 25.5 % (ref 19.6–45.3)
MCH RBC QN AUTO: 29.9 PG (ref 26.6–33)
MCHC RBC AUTO-ENTMCNC: 32.6 G/DL (ref 31.5–35.7)
MCV RBC AUTO: 91.9 FL (ref 79–97)
MONOCYTES # BLD AUTO: 0.65 10*3/MM3 (ref 0.1–0.9)
MONOCYTES NFR BLD AUTO: 8.5 % (ref 5–12)
NEUTROPHILS NFR BLD AUTO: 4.62 10*3/MM3 (ref 1.7–7)
NEUTROPHILS NFR BLD AUTO: 60 % (ref 42.7–76)
NRBC BLD AUTO-RTO: 0 /100 WBC (ref 0–0.2)
PLATELET # BLD AUTO: 400 10*3/MM3 (ref 140–450)
PMV BLD AUTO: 9.2 FL (ref 6–12)
POTASSIUM SERPL-SCNC: 4.2 MMOL/L (ref 3.5–5.2)
PROT SERPL-MCNC: 7.4 G/DL (ref 6–8.5)
RBC # BLD AUTO: 4.21 10*6/MM3 (ref 3.77–5.28)
SODIUM SERPL-SCNC: 143 MMOL/L (ref 136–145)
TRIGL SERPL-MCNC: 166 MG/DL (ref 0–150)
VLDLC SERPL-MCNC: 29 MG/DL (ref 5–40)
WBC NRBC COR # BLD AUTO: 7.68 10*3/MM3 (ref 3.4–10.8)

## 2024-01-12 PROCEDURE — 85025 COMPLETE CBC W/AUTO DIFF WBC: CPT

## 2024-01-12 PROCEDURE — 80053 COMPREHEN METABOLIC PANEL: CPT

## 2024-01-12 PROCEDURE — 80061 LIPID PANEL: CPT

## 2024-01-12 PROCEDURE — 36415 COLL VENOUS BLD VENIPUNCTURE: CPT

## 2024-01-12 PROCEDURE — 86803 HEPATITIS C AB TEST: CPT

## 2024-04-07 DIAGNOSIS — M25.562 PAIN IN BOTH KNEES, UNSPECIFIED CHRONICITY: ICD-10-CM

## 2024-04-07 DIAGNOSIS — M17.0 PRIMARY OSTEOARTHRITIS OF BOTH KNEES: ICD-10-CM

## 2024-04-07 DIAGNOSIS — M25.561 PAIN IN BOTH KNEES, UNSPECIFIED CHRONICITY: ICD-10-CM

## 2024-04-08 RX ORDER — MELOXICAM 15 MG/1
TABLET ORAL
Qty: 90 TABLET | Refills: 0 | Status: SHIPPED | OUTPATIENT
Start: 2024-04-08

## 2024-06-04 ENCOUNTER — APPOINTMENT (OUTPATIENT)
Dept: CT IMAGING | Facility: HOSPITAL | Age: 69
End: 2024-06-04
Payer: MEDICARE

## 2024-06-04 ENCOUNTER — HOSPITAL ENCOUNTER (EMERGENCY)
Facility: HOSPITAL | Age: 69
Discharge: HOME | End: 2024-06-04
Attending: EMERGENCY MEDICINE | Admitting: EMERGENCY MEDICINE
Payer: MEDICARE

## 2024-06-04 VITALS
BODY MASS INDEX: 31.73 KG/M2 | HEIGHT: 62 IN | TEMPERATURE: 98.2 F | HEART RATE: 66 BPM | OXYGEN SATURATION: 98 % | RESPIRATION RATE: 18 BRPM | SYSTOLIC BLOOD PRESSURE: 132 MMHG | DIASTOLIC BLOOD PRESSURE: 67 MMHG | WEIGHT: 172.4 LBS

## 2024-06-04 DIAGNOSIS — N39.0 ACUTE LOWER URINARY TRACT INFECTION: ICD-10-CM

## 2024-06-04 DIAGNOSIS — R10.10 PAIN OF UPPER ABDOMEN: Primary | ICD-10-CM

## 2024-06-04 DIAGNOSIS — K26.9 DUODENAL ULCER: ICD-10-CM

## 2024-06-04 LAB
ALBUMIN SERPL-MCNC: 4.1 G/DL (ref 3.5–5.2)
ALBUMIN/GLOB SERPL: 1.3 G/DL
ALP SERPL-CCNC: 85 U/L (ref 39–117)
ALT SERPL W P-5'-P-CCNC: 19 U/L (ref 1–33)
ANION GAP SERPL CALCULATED.3IONS-SCNC: 18.8 MMOL/L (ref 5–15)
AST SERPL-CCNC: 23 U/L (ref 1–32)
BACTERIA UR QL AUTO: ABNORMAL /HPF
BASOPHILS # BLD AUTO: 0.05 10*3/MM3 (ref 0–0.2)
BASOPHILS NFR BLD AUTO: 0.4 % (ref 0–1.5)
BILIRUB SERPL-MCNC: 0.5 MG/DL (ref 0–1.2)
BILIRUB UR QL STRIP: NEGATIVE
BUN SERPL-MCNC: 30 MG/DL (ref 8–23)
BUN/CREAT SERPL: 29.4 (ref 7–25)
CALCIUM SPEC-SCNC: 9.5 MG/DL (ref 8.6–10.5)
CHLORIDE SERPL-SCNC: 98 MMOL/L (ref 98–107)
CLARITY UR: CLEAR
CO2 SERPL-SCNC: 21.2 MMOL/L (ref 22–29)
COLOR UR: YELLOW
CREAT SERPL-MCNC: 1.02 MG/DL (ref 0.57–1)
D-LACTATE SERPL-SCNC: 1.8 MMOL/L (ref 0.5–2)
DEPRECATED RDW RBC AUTO: 40 FL (ref 37–54)
EGFRCR SERPLBLD CKD-EPI 2021: 59.7 ML/MIN/1.73
EOSINOPHIL # BLD AUTO: 0.04 10*3/MM3 (ref 0–0.4)
EOSINOPHIL NFR BLD AUTO: 0.3 % (ref 0.3–6.2)
ERYTHROCYTE [DISTWIDTH] IN BLOOD BY AUTOMATED COUNT: 12.2 % (ref 12.3–15.4)
GLOBULIN UR ELPH-MCNC: 3.2 GM/DL
GLUCOSE SERPL-MCNC: 120 MG/DL (ref 65–99)
GLUCOSE UR STRIP-MCNC: NEGATIVE MG/DL
HCT VFR BLD AUTO: 41.2 % (ref 34–46.6)
HGB BLD-MCNC: 13.7 G/DL (ref 12–15.9)
HGB UR QL STRIP.AUTO: ABNORMAL
HOLD SPECIMEN: NORMAL
HOLD SPECIMEN: NORMAL
HYALINE CASTS UR QL AUTO: ABNORMAL /LPF
IMM GRANULOCYTES # BLD AUTO: 0.05 10*3/MM3 (ref 0–0.05)
IMM GRANULOCYTES NFR BLD AUTO: 0.4 % (ref 0–0.5)
KETONES UR QL STRIP: ABNORMAL
LEUKOCYTE ESTERASE UR QL STRIP.AUTO: ABNORMAL
LIPASE SERPL-CCNC: 37 U/L (ref 13–60)
LYMPHOCYTES # BLD AUTO: 1.76 10*3/MM3 (ref 0.7–3.1)
LYMPHOCYTES NFR BLD AUTO: 13.4 % (ref 19.6–45.3)
MCH RBC QN AUTO: 30 PG (ref 26.6–33)
MCHC RBC AUTO-ENTMCNC: 33.3 G/DL (ref 31.5–35.7)
MCV RBC AUTO: 90.4 FL (ref 79–97)
MONOCYTES # BLD AUTO: 1.05 10*3/MM3 (ref 0.1–0.9)
MONOCYTES NFR BLD AUTO: 8 % (ref 5–12)
NEUTROPHILS NFR BLD AUTO: 10.18 10*3/MM3 (ref 1.7–7)
NEUTROPHILS NFR BLD AUTO: 77.5 % (ref 42.7–76)
NITRITE UR QL STRIP: POSITIVE
NRBC BLD AUTO-RTO: 0 /100 WBC (ref 0–0.2)
PH UR STRIP.AUTO: 8 [PH] (ref 5–8)
PLATELET # BLD AUTO: 446 10*3/MM3 (ref 140–450)
PMV BLD AUTO: 9.3 FL (ref 6–12)
POTASSIUM SERPL-SCNC: 3.3 MMOL/L (ref 3.5–5.2)
PROT SERPL-MCNC: 7.3 G/DL (ref 6–8.5)
PROT UR QL STRIP: ABNORMAL
RBC # BLD AUTO: 4.56 10*6/MM3 (ref 3.77–5.28)
RBC # UR STRIP: ABNORMAL /HPF
REF LAB TEST METHOD: ABNORMAL
SODIUM SERPL-SCNC: 138 MMOL/L (ref 136–145)
SP GR UR STRIP: >1.03 (ref 1–1.03)
SQUAMOUS #/AREA URNS HPF: ABNORMAL /HPF
UROBILINOGEN UR QL STRIP: ABNORMAL
WBC # UR STRIP: ABNORMAL /HPF
WBC NRBC COR # BLD AUTO: 13.13 10*3/MM3 (ref 3.4–10.8)
WHOLE BLOOD HOLD COAG: NORMAL
WHOLE BLOOD HOLD SPECIMEN: NORMAL

## 2024-06-04 PROCEDURE — 96374 THER/PROPH/DIAG INJ IV PUSH: CPT

## 2024-06-04 PROCEDURE — 74177 CT ABD & PELVIS W/CONTRAST: CPT

## 2024-06-04 PROCEDURE — 25510000001 IOPAMIDOL PER 1 ML: Performed by: EMERGENCY MEDICINE

## 2024-06-04 PROCEDURE — 99285 EMERGENCY DEPT VISIT HI MDM: CPT

## 2024-06-04 PROCEDURE — 83690 ASSAY OF LIPASE: CPT

## 2024-06-04 PROCEDURE — 25810000003 SODIUM CHLORIDE 0.9 % SOLUTION: Performed by: NURSE PRACTITIONER

## 2024-06-04 PROCEDURE — 96375 TX/PRO/DX INJ NEW DRUG ADDON: CPT

## 2024-06-04 PROCEDURE — 80053 COMPREHEN METABOLIC PANEL: CPT

## 2024-06-04 PROCEDURE — 81001 URINALYSIS AUTO W/SCOPE: CPT | Performed by: EMERGENCY MEDICINE

## 2024-06-04 PROCEDURE — 83605 ASSAY OF LACTIC ACID: CPT

## 2024-06-04 PROCEDURE — 25010000002 ONDANSETRON PER 1 MG: Performed by: NURSE PRACTITIONER

## 2024-06-04 PROCEDURE — 85025 COMPLETE CBC W/AUTO DIFF WBC: CPT

## 2024-06-04 RX ORDER — PANTOPRAZOLE SODIUM 40 MG/10ML
80 INJECTION, POWDER, LYOPHILIZED, FOR SOLUTION INTRAVENOUS ONCE
Status: COMPLETED | OUTPATIENT
Start: 2024-06-04 | End: 2024-06-04

## 2024-06-04 RX ORDER — PANTOPRAZOLE SODIUM 40 MG/1
40 TABLET, DELAYED RELEASE ORAL DAILY
Qty: 30 TABLET | Refills: 0 | Status: SHIPPED | OUTPATIENT
Start: 2024-06-04 | End: 2024-07-04

## 2024-06-04 RX ORDER — SODIUM CHLORIDE 0.9 % (FLUSH) 0.9 %
10 SYRINGE (ML) INJECTION AS NEEDED
Status: DISCONTINUED | OUTPATIENT
Start: 2024-06-04 | End: 2024-06-04 | Stop reason: HOSPADM

## 2024-06-04 RX ORDER — ONDANSETRON 4 MG/1
4 TABLET, ORALLY DISINTEGRATING ORAL EVERY 8 HOURS PRN
Qty: 20 TABLET | Refills: 0 | Status: SHIPPED | OUTPATIENT
Start: 2024-06-04

## 2024-06-04 RX ORDER — POTASSIUM CHLORIDE 750 MG/1
40 CAPSULE, EXTENDED RELEASE ORAL ONCE
Status: COMPLETED | OUTPATIENT
Start: 2024-06-04 | End: 2024-06-04

## 2024-06-04 RX ORDER — CEPHALEXIN 500 MG/1
500 CAPSULE ORAL 2 TIMES DAILY
Qty: 14 CAPSULE | Refills: 0 | Status: SHIPPED | OUTPATIENT
Start: 2024-06-04 | End: 2024-06-11

## 2024-06-04 RX ORDER — ONDANSETRON 2 MG/ML
4 INJECTION INTRAMUSCULAR; INTRAVENOUS ONCE
Status: COMPLETED | OUTPATIENT
Start: 2024-06-04 | End: 2024-06-04

## 2024-06-04 RX ORDER — SUCRALFATE 1 G/1
1 TABLET ORAL 4 TIMES DAILY
Qty: 120 TABLET | Refills: 0 | Status: SHIPPED | OUTPATIENT
Start: 2024-06-04 | End: 2024-07-04

## 2024-06-04 RX ADMIN — ONDANSETRON 4 MG: 2 INJECTION INTRAMUSCULAR; INTRAVENOUS at 13:05

## 2024-06-04 RX ADMIN — PANTOPRAZOLE SODIUM 80 MG: 40 INJECTION, POWDER, FOR SOLUTION INTRAVENOUS at 13:05

## 2024-06-04 RX ADMIN — IOPAMIDOL 100 ML: 755 INJECTION, SOLUTION INTRAVENOUS at 12:37

## 2024-06-04 RX ADMIN — POTASSIUM CHLORIDE 40 MEQ: 750 CAPSULE, EXTENDED RELEASE ORAL at 13:06

## 2024-06-04 RX ADMIN — SODIUM CHLORIDE 1000 ML: 9 INJECTION, SOLUTION INTRAVENOUS at 13:05

## 2024-06-04 NOTE — ED PROVIDER NOTES
Time: 11:30 AM EDT  Date of encounter:  2024  Independent Historian/Clinical History and Information was obtained by:   Patient    History is limited by: N/A    Chief Complaint: vomiting and diarrhea      History of Present Illness:  Patient is a 68 y.o. year old female who presents to the emergency department for evaluation of vomiting and diarrhea, unable to eat for 5 days. Has an appt with GI in 1 week. Says she began having abdominal bloating 10/2023, her PCP did a KUB that was normal but due to ongoing sx was referred to GI; her appt is on 23 with MYRIAM Corado. She says they were traveling back from [place] last week and stopped to eat at [facility] on 24. She says that later that evening, she began having pain and 'fullness' and has not been able to eat since then. She had several episodes of vomiting on 24 and has just felt very nauseated and unable to eat since then. Had a large diarrhea bowel movement this morning. No fever, chills, says she feels generally weak.    HPI    Patient Care Team  Primary Care Provider: Arnaldo Thomas DO    Past Medical History:     No Known Allergies  Past Medical History:   Diagnosis Date    Cancer     Hypertension      Past Surgical History:   Procedure Laterality Date     SECTION      HYSTERECTOMY       Family History   Problem Relation Age of Onset    Cancer Mother     Diabetes Father     Diabetes Sister        Home Medications:  Prior to Admission medications    Medication Sig Start Date End Date Taking? Authorizing Provider   atorvastatin (Lipitor) 10 MG tablet Take 1 tablet by mouth Daily. 24   Arnaldo Thomas DO   calcium carbonate (OS-ALEX) 1250 (500 Ca) MG tablet Calcium 500 500 mg calcium (1,250 mg) oral tablet take 1 tablet by oral route daily   Active    Provider, MD Jeronimo   glucosamine-chondroitin 500-400 MG capsule capsule Take  by mouth 3 (Three) Times a Day With Meals.    ProviderJeronimo MD  "  lisinopril-hydrochlorothiazide (PRINZIDE,ZESTORETIC) 10-12.5 MG per tablet Take 1 tablet by mouth Daily. 1/8/24   Arnaldo Thomas DO   meloxicam (MOBIC) 15 MG tablet TAKE 1 TABLET BY MOUTH EVERY DAY 4/8/24   Mani Wayne MD   multivitamin with minerals tablet tablet multivitamin oral tablet take 1 tablet by oral route daily   Active    Provider, MD Jeronimo        Social History:   Social History     Tobacco Use    Smoking status: Never    Smokeless tobacco: Never   Vaping Use    Vaping status: Never Used   Substance Use Topics    Alcohol use: Never    Drug use: Never         Review of Systems:  Review of Systems   Constitutional: Negative.    HENT: Negative.     Eyes: Negative.    Respiratory: Negative.     Cardiovascular: Negative.    Gastrointestinal:  Positive for abdominal distention, abdominal pain, nausea and vomiting.   Endocrine: Negative.    Genitourinary: Negative.    Musculoskeletal: Negative.    Skin: Negative.    Allergic/Immunologic: Negative.    Neurological: Negative.    Hematological: Negative.    Psychiatric/Behavioral: Negative.          Physical Exam:  /67 (BP Location: Left arm, Patient Position: Lying)   Pulse 66   Temp 98.2 °F (36.8 °C) (Oral)   Resp 18   Ht 157.5 cm (62\")   Wt 78.2 kg (172 lb 6.4 oz)   SpO2 98%   BMI 31.53 kg/m²     Physical Exam  Constitutional:       Appearance: Normal appearance.   HENT:      Head: Normocephalic and atraumatic.      Nose: Nose normal.      Mouth/Throat:      Mouth: Mucous membranes are moist.   Eyes:      Pupils: Pupils are equal, round, and reactive to light.   Cardiovascular:      Rate and Rhythm: Normal rate and regular rhythm.      Pulses: Normal pulses.   Pulmonary:      Effort: Pulmonary effort is normal.      Breath sounds: Normal breath sounds.   Abdominal:      General: Bowel sounds are normal. There is distension.      Palpations: Abdomen is soft.      Tenderness:  in the right upper quadrant and epigastric area "   Musculoskeletal:         General: Normal range of motion.      Cervical back: Normal range of motion.   Skin:     General: Skin is warm and dry.      Capillary Refill: Capillary refill takes less than 2 seconds.   Neurological:      General: No focal deficit present.      Mental Status: She is alert and oriented to person, place, and time. Mental status is at baseline.   Psychiatric:         Mood and Affect: Mood normal.                  Procedures:  Procedures      Medical Decision Making:      Comorbidities that affect care:    Hypertension    External Notes reviewed:    None      The following orders were placed and all results were independently analyzed by me:  Orders Placed This Encounter   Procedures    CT Abdomen Pelvis With Contrast    [place] Draw    Comprehensive Metabolic Panel    Lipase    Urinalysis With Microscopic If Indicated (No Culture) - Urine, Clean Catch    Lactic Acid, Plasma    CBC Auto Differential    Urinalysis, Microscopic Only - Urine, Clean Catch    NPO Diet NPO Type: Strict NPO    Undress & Gown    Insert Peripheral IV    CBC & Differential    Green Top (Gel)    Lavender Top    Gold Top - SST    Light Blue Top       Medications Given in the Emergency Department:  Medications   sodium chloride 0.9 % flush 10 mL (has no administration in time range)   sodium chloride 0.9 % bolus 1,000 mL (1,000 mL Intravenous New Bag 6/4/24 1305)   ondansetron (ZOFRAN) injection 4 mg (4 mg Intravenous Given 6/4/24 1305)   pantoprazole (PROTONIX) injection 80 mg (80 mg Intravenous Given 6/4/24 1305)   potassium chloride (MICRO-K/KLOR-CON) CR capsule (40 mEq Oral Given 6/4/24 1306)   iopamidol (ISOVUE-370) 76 % injection 100 mL (100 mL Intravenous Given 6/4/24 1237)        ED Course:         Labs:    Lab Results (last 24 hours)       Procedure Component Value Units Date/Time    CBC & Differential [[id number]]  (Abnormal) Collected: 06/04/24 1048    Specimen: Blood Updated: 06/04/24 1116    Narrative:       The following orders were created for panel order CBC & Differential.  Procedure                               Abnormality         Status                     ---------                               -----------         ------                     CBC Auto Differential[[id number]]        Abnormal            Final result                 Please view results for these tests on the individual orders.    Comprehensive Metabolic Panel [[id number]]  (Abnormal) Collected: 06/04/24 1048    Specimen: Blood Updated: 06/04/24 1136     Glucose 120 mg/dL      BUN 30 mg/dL      Creatinine 1.02 mg/dL      Sodium 138 mmol/L      Potassium 3.3 mmol/L      Comment: Slight hemolysis detected by analyzer. Result may be falsely elevated.        Chloride 98 mmol/L      CO2 21.2 mmol/L      Calcium 9.5 mg/dL      Total Protein 7.3 g/dL      Albumin 4.1 g/dL      ALT (SGPT) 19 U/L      AST (SGOT) 23 U/L      Alkaline Phosphatase 85 U/L      Total Bilirubin 0.5 mg/dL      Globulin 3.2 gm/dL      A/G Ratio 1.3 g/dL      BUN/Creatinine Ratio 29.4     Anion Gap 18.8 mmol/L      eGFR 59.7 mL/min/1.73     Narrative:      GFR Normal >60  Chronic Kidney Disease <60  Kidney Failure <15      Lipase [[id number]]  (Normal) Collected: 06/04/24 1048    Specimen: Blood Updated: 06/04/24 1136     Lipase 37 U/L     Lactic Acid, Plasma [[id number]]  (Normal) Collected: 06/04/24 1048    Specimen: Blood Updated: 06/04/24 1134     Lactate 1.8 mmol/L     CBC Auto Differential [[id number]]  (Abnormal) Collected: 06/04/24 1048    Specimen: Blood Updated: 06/04/24 1116     WBC 13.13 10*3/mm3      RBC 4.56 10*6/mm3      Hemoglobin 13.7 g/dL      Hematocrit 41.2 %      MCV 90.4 fL      MCH 30.0 pg      MCHC 33.3 g/dL      RDW 12.2 %      RDW-SD 40.0 fl      MPV 9.3 fL      Platelets 446 10*3/mm3      Neutrophil % 77.5 %      Lymphocyte % 13.4 %      Monocyte % 8.0 %      Eosinophil % 0.3 %      Basophil % 0.4 %      Immature Grans % 0.4 %      Neutrophils, Absolute 10.18  10*3/mm3      Lymphocytes, Absolute 1.76 10*3/mm3      Monocytes, Absolute 1.05 10*3/mm3      Eosinophils, Absolute 0.04 10*3/mm3      Basophils, Absolute 0.05 10*3/mm3      Immature Grans, Absolute 0.05 10*3/mm3      nRBC 0.0 /100 WBC     Urinalysis With Microscopic If Indicated (No Culture) - Urine, Clean Catch [[id number]]  (Abnormal) Collected: 06/04/24 1358    Specimen: Urine, Clean Catch Updated: 06/04/24 1410     Color, UA Yellow     Appearance, UA Clear     pH, UA 8.0     Specific Gravity, UA >1.030     Glucose, UA Negative     Ketones, UA 40 mg/dL (2+)     Bilirubin, UA Negative     Blood, UA Large (3+)     Protein, UA Trace     Leuk Esterase, UA Moderate (2+)     Nitrite, UA Positive     Urobilinogen, UA 0.2 E.U./dL    Urinalysis, Microscopic Only - Urine, Clean Catch [[id number]]  (Abnormal) Collected: 06/04/24 1358    Specimen: Urine, Clean Catch Updated: 06/04/24 1410     RBC, UA Too Numerous to Count /HPF      WBC, UA Too Numerous to Count /HPF      Bacteria, UA 4+ /HPF      Squamous Epithelial Cells, UA 0-2 /HPF      Hyaline Casts, UA 3-6 /LPF      Methodology Automated Microscopy             Imaging:    CT Abdomen Pelvis With Contrast    Result Date: 6/4/2024  CT ABDOMEN PELVIS W CONTRAST Date of Exam: 6/4/2024 12:33 PM EDT Indication: upper abd pain, vomiting. Comparison: None available. Technique: Axial CT images were obtained of the abdomen and pelvis after the uneventful intravenous administration of 100 cc Isovue-370. Reconstructed coronal and sagittal images were also obtained. Automated exposure control and iterative construction methods were used. Findings: LUNG BASES:  Unremarkable without mass or infiltrate. LIVER:  Unremarkable parenchyma without focal lesion. BILIARY/GALLBLADDER:  Unremarkable SPLEEN:  Unremarkable PANCREAS:  Unremarkable ADRENAL:  Unremarkable KIDNEYS: Focal medial right renal cortical atrophy. No solid mass identified.  There is a 9 mm nonobstructive calculus versus  parenchymal calcification in the mid left kidney. There is a 5 mm calculus within the left renal pelvis (image 83, series 201 without obstruction. GASTROINTESTINAL/MESENTERY: There is mural thickening and enhancement with probable ulceration involving the first and second portion of the duodenum. There is generalized gastric distention, likely related. Intestinal tract is otherwise unremarkable. The appendix is normal. MESENTERIC VESSELS:  Patent. AORTA/IVC:  Normal caliber. RETROPERITONEUM/LYMPH NODES:  Unremarkable REPRODUCTIVE: Hysterectomy BLADDER:  Unremarkable OSSEUS STRUCTURES:  Typical for age with no acute process identified.     Impression: 1.Proximal duodenitis with probable mucosal ulceration. No perforation identified. Likely resultant gastric dysmotility with distention. 2.There is a 5 mm right renal pelvic calcification. No evidence of obstruction. Probable additional right renal calculus. Electronically Signed: Rogelio Briceño MD  6/4/2024 12:56 PM EDT  Workstation ID: [id number]       Differential Diagnosis and Discussion:    Abdominal Pain: Based on the patient's signs and symptoms, I considered abdominal aortic aneurysm, small bowel obstruction, pancreatitis, acute cholecystitis, acute appendecitis, peptic ulcer disease, gastritis, colitis, endocrine disorders, irritable bowel syndrome and other differential diagnosis an etiology of the patient's abdominal pain.    All labs were reviewed and interpreted by me.  CT scan radiology impression was interpreted by me.    MDM     Amount and/or Complexity of Data Reviewed  Clinical lab tests: reviewed  Tests in the radiology section of CPT®: reviewed                 Patient Care Considerations:           Consultants/Shared Management Plan:    None    Social Determinants of Health:    Patient is independent, reliable, and has access to care.       Disposition and Care Coordination:    Discharged: The patient is suitable and stable for discharge with no need  for consideration of admission.    I have explained the patient´s condition, diagnoses and treatment plan based on the information available to me at this time. I have answered questions and addressed any concerns. The patient has a good  understanding of the patient´s diagnosis, condition, and treatment plan as can be expected at this point. The vital signs have been stable. The patient´s condition is stable and appropriate for discharge from the emergency department.      The patient will pursue further outpatient evaluation with the primary care physician or other designated or consulting physician as outlined in the discharge instructions. They are agreeable to this plan of care and follow-up instructions have been explained in detail. The patient has received these instructions in written format and has expressed an understanding of the discharge instructions. The patient is aware that any significant change in condition or worsening of symptoms should prompt an immediate return to this or the closest emergency department or call to 911.  I have explained discharge medications and the need for follow up with the patient/caretakers. This was also printed in the discharge instructions. Patient was discharged with the following medications and follow up:      Medication List        New Prescriptions      cephalexin 500 MG capsule  Commonly known as: KEFLEX  Take 1 capsule by mouth 2 (Two) Times a Day for 7 days.     ondansetron ODT 4 MG disintegrating tablet  Commonly known as: ZOFRAN-ODT  Place 1 tablet on the tongue Every 8 (Eight) Hours As Needed for Vomiting or Nausea.     pantoprazole 40 MG EC tablet  Commonly known as: PROTONIX  Take 1 tablet by mouth Daily for 30 days.     sucralfate 1 g tablet  Commonly known as: CARAFATE  Take 1 tablet by mouth 4 (Four) Times a Day for 30 days.               Where to Get Your Medications        These medications were sent to [facility]/pharmacy #[id number] - [place] - [address] - [phone]  - [phone] FX  [address] [place] [address]      Hours: 24-hours Phone: [phone]   cephalexin 500 MG capsule  ondansetron ODT 4 MG disintegrating tablet  pantoprazole 40 MG EC tablet  sucralfate 1 g tablet      Arnaldo Thomas, DO  [address]  [address]  [place] [address]  [phone]    In 1 week      Gayla Loyola, APRN  [address] [place] [address]  [phone]      Keep your scheduled appt on 6/11/24       Final diagnoses:   Pain of upper abdomen   Duodenal ulcer   Acute lower urinary tract infection        ED Disposition       ED Disposition   Discharge    Condition   Stable    Comment   --               This medical record created using voice recognition software.             Peggy Viveros, APRN  06/04/24 6411

## 2024-06-04 NOTE — DISCHARGE INSTRUCTIONS
Rest, take frequent sips of fluids and advance diet slowly as tolerated  Refer to the attached peptic ulcer eating plan for dietary suggestions  Take the protonix and carafate daily as prescribed  Take the zofran as needed for nausea/vomiting  Follow up with your PCP in 1 week for urine recheck, Keep your scheduled appt with GI on 6/11/24  Your urine will be sent for culture, if there is any reason to change your antibiotic based on this we will call you in the next 2 to 3 days. Otherwise, take the full course of Keflex as prescribed.  Return to the ER for onset of fever, intractable pain/vomiting, shortness of breath, chest pain, syncope/near syncope or any other worsening or new symptoms of concern

## 2024-06-07 NOTE — PROGRESS NOTES
Chief Complaint        Abdominal Pain and Nausea    History of Present Illness      Pily Rice is a 68 y.o. female who presents to [facility] as a new patient with a history of abdominal pain, nausea, vomiting, altered bowel movements, abnormal imaging of the abdomen and ulcers with use of NSAIDs.  She reports abdominal pain has been ongoing for about a year.  Patient reports abdominal pain across the central abdomen and occasionally off to the right side of the abdomen.  Patient reports when traveling last week she ate dinner and then experienced pain and fullness she began vomiting 2 days later and was having nausea vomiting and diarrhea.  Patient was seen in the emergency department where she had a CT scan performed which confirmed ulcers.  Patient was placed on Protonix and Carafate with use of Zofran as needed.  Patient reports she has not had to take the Zofran.  She reports bowel movements following that bout have been dark in color.  She denies any family history of colon cancer or gastric cancer.  No previous colon polyps.  Patient continues to experience intermittent nausea but no vomiting for 1 week.  Patient denies fever,weight loss, night sweats,  hematochezia, hematemesis.    Most recent labs- 6/4/2024    CT Abdomen/Pelvis w/ contrast- 6/4/2024  1.Proximal duodenitis with probable mucosal ulceration. No perforation identified. Likely resultant gastric dysmotility with distention.   2.There is a 5 mm right renal pelvic calcification. No evidence of obstruction. Probable additional right renal calculus.       Colonoscopy: Review of the patient's most recent colonoscopy performed by Dr. Jo on 9/15/2017 internal hemorrhoids    Results       Result Review :   The following data was reviewed by: MYRIAM Avilez on 06/11/2024     CMP          1/12/2024    10:50 6/4/2024    10:48   CMP   Glucose 109  120    BUN 29  30    Creatinine 1.17  1.02    EGFR 50.6  59.7   "  Sodium 143  138    Potassium 4.2  3.3    Chloride 104  98    Calcium 10.3  9.5    Total Protein 7.4  7.3    Albumin 4.7  4.1    Globulin 2.7  3.2    Total Bilirubin <0.2  0.5    Alkaline Phosphatase 89  85    AST (SGOT) 22  23    ALT (SGPT) 16  19    Albumin/Globulin Ratio 1.7  1.3    BUN/Creatinine Ratio 24.8  29.4    Anion Gap 14.6  18.8      CBC          2024    10:50 2024    10:48   CBC   WBC 7.68  13.13    RBC 4.21  4.56    Hemoglobin 12.6  13.7    Hematocrit 38.7  41.2    MCV 91.9  90.4    MCH 29.9  30.0    MCHC 32.6  33.3    RDW 12.1  12.2    Platelets 400  446        Iron Profile No results found for: \"IRON\", \"TIBC\", \"LABIRON\", \"TRANSFERRIN\"  Ferritin No results found for: \"FERRITIN\"         Past Medical History       Past Medical History:   Diagnosis Date    Cancer     GERD (gastroesophageal reflux disease)     Hyperlipidemia     Hypertension     Ulcer     CAT scan on 2024       Past Surgical History:   Procedure Laterality Date    ABDOMINAL SURGERY      Hysterectomy     SECTION      COLONOSCOPY      HYSTERECTOMY           Current Outpatient Medications:     atorvastatin (Lipitor) 10 MG tablet, Take 1 tablet by mouth Daily., Disp: 90 tablet, Rfl: 3    calcium carbonate (OS-ALEX) 1250 (500 Ca) MG tablet, Calcium 500 500 mg calcium (1,250 mg) oral tablet take 1 tablet by oral route daily   Active, Disp: , Rfl:     glucosamine-chondroitin 500-400 MG capsule capsule, Take  by mouth 3 (Three) Times a Day With Meals., Disp: , Rfl:     lisinopril-hydrochlorothiazide (PRINZIDE,ZESTORETIC) 10-12.5 MG per tablet, Take 1 tablet by mouth Daily., Disp: 90 tablet, Rfl: 3    multivitamin with minerals tablet tablet, multivitamin oral tablet take 1 tablet by oral route daily   Active, Disp: , Rfl:     ondansetron ODT (ZOFRAN-ODT) 4 MG disintegrating tablet, Place 1 tablet on the tongue Every 8 (Eight) Hours As Needed for Vomiting or Nausea., Disp: 20 tablet, Rfl: 0    " "pantoprazole (PROTONIX) 40 MG EC tablet, Take 1 tablet by mouth Daily for 30 days., Disp: 30 tablet, Rfl: 2    sucralfate (CARAFATE) 1 g tablet, Take 1 tablet by mouth 4 (Four) Times a Day for 30 days., Disp: 120 tablet, Rfl: 2    cephalexin (KEFLEX) 500 MG capsule, Take 1 capsule by mouth 2 (Two) Times a Day for 7 days. (Patient not taking: Reported on 6/11/2024), Disp: 14 capsule, Rfl: 0    Sod Picosulfate-Mag Ox-Cit Acd (Clenpiq) 10-3.5-12 MG-GM -GM/160ML solution, Take 175 mL by mouth 1 (One) Time for 1 dose. As directed by office., Disp: 350 mL, Rfl: 0     No Known Allergies    Family History   Problem Relation Age of Onset    Cancer Mother     Diabetes Father     Diabetes Sister     Colon cancer Neg Hx         Social History     Social History Narrative    Not on file       Objective       Objective     Vital Signs:   /62 (BP Location: Left arm, Patient Position: Sitting, Cuff Size: Adult)   Pulse 65   Ht 157.5 cm (62\")   Wt 81.8 kg (180 lb 6.4 oz)   SpO2 100%   BMI 33.00 kg/m²     Body mass index is 33 kg/m².    Physical Exam  Constitutional:       Appearance: Normal appearance.   Pulmonary:      Effort: Pulmonary effort is normal.   Neurological:      General: No focal deficit present.      Mental Status: She is alert and oriented to person, place, and time.   Psychiatric:         Mood and Affect: Mood normal.         Behavior: Behavior normal.              Assessment & Plan          Assessment and Plan    Diagnoses and all orders for this visit:    1. Gastroesophageal reflux disease, unspecified whether esophagitis present (Primary)  -     Case Request; Standing  -     Verify NPO; Standing  -     Verify Bowel Prep Was Successful; Standing  -     Give Tap Water Enema If Bowel Prep Insufficient; Standing  -     Obtain Informed Consent; Standing  -     Follow Anesthesia Guidelines / Protocol; Standing  -     Case Request    2. Nausea  -     Case Request; Standing  -     Verify NPO; Standing  -     " Verify Bowel Prep Was Successful; Standing  -     Give Tap Water Enema If Bowel Prep Insufficient; Standing  -     Obtain Informed Consent; Standing  -     Follow Anesthesia Guidelines / Protocol; Standing  -     Case Request    3. Nausea and vomiting, unspecified vomiting type  -     Case Request; Standing  -     Verify NPO; Standing  -     Verify Bowel Prep Was Successful; Standing  -     Give Tap Water Enema If Bowel Prep Insufficient; Standing  -     Obtain Informed Consent; Standing  -     Follow Anesthesia Guidelines / Protocol; Standing  -     Case Request    4. Altered bowel habits  -     Case Request; Standing  -     Verify NPO; Standing  -     Verify Bowel Prep Was Successful; Standing  -     Give Tap Water Enema If Bowel Prep Insufficient; Standing  -     Obtain Informed Consent; Standing  -     Follow Anesthesia Guidelines / Protocol; Standing  -     Case Request    5. Diarrhea, unspecified type  -     Case Request; Standing  -     Verify NPO; Standing  -     Verify Bowel Prep Was Successful; Standing  -     Give Tap Water Enema If Bowel Prep Insufficient; Standing  -     Obtain Informed Consent; Standing  -     Follow Anesthesia Guidelines / Protocol; Standing  -     Case Request    6. Abnormal CT of the abdomen  -     Case Request; Standing  -     Verify NPO; Standing  -     Verify Bowel Prep Was Successful; Standing  -     Give Tap Water Enema If Bowel Prep Insufficient; Standing  -     Obtain Informed Consent; Standing  -     Follow Anesthesia Guidelines / Protocol; Standing  -     Case Request    7. Duodenitis  -     Case Request; Standing  -     Verify NPO; Standing  -     Verify Bowel Prep Was Successful; Standing  -     Give Tap Water Enema If Bowel Prep Insufficient; Standing  -     Obtain Informed Consent; Standing  -     Follow Anesthesia Guidelines / Protocol; Standing  -     Case Request    8. Generalized abdominal pain  -     Case Request; Standing  -     Verify NPO; Standing  -     Verify  Bowel Prep Was Successful; Standing  -     Give Tap Water Enema If Bowel Prep Insufficient; Standing  -     Obtain Informed Consent; Standing  -     Follow Anesthesia Guidelines / Protocol; Standing  -     Case Request    Other orders  -     pantoprazole (PROTONIX) 40 MG EC tablet; Take 1 tablet by mouth Daily for 30 days.  Dispense: 30 tablet; Refill: 2  -     sucralfate (CARAFATE) 1 g tablet; Take 1 tablet by mouth 4 (Four) Times a Day for 30 days.  Dispense: 120 tablet; Refill: 2  -     Sod Picosulfate-Mag Ox-Cit Acd (Clenpiq) 10-3.5-12 MG-GM -GM/160ML solution; Take 175 mL by mouth 1 (One) Time for 1 dose. As directed by office.  Dispense: 350 mL; Refill: 0        68-year-old female presenting to the office today as a new patient with a history of abdominal pain, nausea, vomiting, altered bowel movements, abnormal imaging of the abdomen and ulcers with use of NSAIDs.  I have recommended that the patient undergo further evaluation with a colonoscopy and EGD.  I have discussed this procedure in detail with the patient.  I have discussed the risks, benefits and alternatives.  I have discussed the risk of anesthesia, bleeding and perforation.  Patient understands these risks, benefits and alternatives and wishes to proceed.  I will schedule her at her earliest convenience.  I have refilled the patient's Protonix and Carafate.  I have advised her to avoid NSAIDs.  Patient agreeable to this plan and will call with any questions or concerns.           Follow Up       Follow Up   Return for Follow up after endoscopy in office.  Patient was given instructions and counseling regarding her condition or for health maintenance advice. Please see specific information pulled into the AVS if appropriate.

## 2024-06-07 NOTE — H&P (VIEW-ONLY)
Chief Complaint        Abdominal Pain and Nausea    History of Present Illness      Pily Rice is a 68 y.o. female who presents to [facility] as a new patient with a history of abdominal pain, nausea, vomiting, altered bowel movements, abnormal imaging of the abdomen and ulcers with use of NSAIDs.  She reports abdominal pain has been ongoing for about a year.  Patient reports abdominal pain across the central abdomen and occasionally off to the right side of the abdomen.  Patient reports when traveling last week she ate dinner and then experienced pain and fullness she began vomiting 2 days later and was having nausea vomiting and diarrhea.  Patient was seen in the emergency department where she had a CT scan performed which confirmed ulcers.  Patient was placed on Protonix and Carafate with use of Zofran as needed.  Patient reports she has not had to take the Zofran.  She reports bowel movements following that bout have been dark in color.  She denies any family history of colon cancer or gastric cancer.  No previous colon polyps.  Patient continues to experience intermittent nausea but no vomiting for 1 week.  Patient denies fever,weight loss, night sweats,  hematochezia, hematemesis.    Most recent labs- 6/4/2024    CT Abdomen/Pelvis w/ contrast- 6/4/2024  1.Proximal duodenitis with probable mucosal ulceration. No perforation identified. Likely resultant gastric dysmotility with distention.   2.There is a 5 mm right renal pelvic calcification. No evidence of obstruction. Probable additional right renal calculus.       Colonoscopy: Review of the patient's most recent colonoscopy performed by Dr. Jo on 9/15/2017 internal hemorrhoids    Results       Result Review :   The following data was reviewed by: MYRIAM Avilez on 06/11/2024     CMP          1/12/2024    10:50 6/4/2024    10:48   CMP   Glucose 109  120    BUN 29  30    Creatinine 1.17  1.02    EGFR 50.6  59.7   "  Sodium 143  138    Potassium 4.2  3.3    Chloride 104  98    Calcium 10.3  9.5    Total Protein 7.4  7.3    Albumin 4.7  4.1    Globulin 2.7  3.2    Total Bilirubin <0.2  0.5    Alkaline Phosphatase 89  85    AST (SGOT) 22  23    ALT (SGPT) 16  19    Albumin/Globulin Ratio 1.7  1.3    BUN/Creatinine Ratio 24.8  29.4    Anion Gap 14.6  18.8      CBC          2024    10:50 2024    10:48   CBC   WBC 7.68  13.13    RBC 4.21  4.56    Hemoglobin 12.6  13.7    Hematocrit 38.7  41.2    MCV 91.9  90.4    MCH 29.9  30.0    MCHC 32.6  33.3    RDW 12.1  12.2    Platelets 400  446        Iron Profile No results found for: \"IRON\", \"TIBC\", \"LABIRON\", \"TRANSFERRIN\"  Ferritin No results found for: \"FERRITIN\"         Past Medical History       Past Medical History:   Diagnosis Date    Cancer     GERD (gastroesophageal reflux disease)     Hyperlipidemia     Hypertension     Ulcer     CAT scan on 2024       Past Surgical History:   Procedure Laterality Date    ABDOMINAL SURGERY      Hysterectomy     SECTION      COLONOSCOPY      HYSTERECTOMY           Current Outpatient Medications:     atorvastatin (Lipitor) 10 MG tablet, Take 1 tablet by mouth Daily., Disp: 90 tablet, Rfl: 3    calcium carbonate (OS-ALEX) 1250 (500 Ca) MG tablet, Calcium 500 500 mg calcium (1,250 mg) oral tablet take 1 tablet by oral route daily   Active, Disp: , Rfl:     glucosamine-chondroitin 500-400 MG capsule capsule, Take  by mouth 3 (Three) Times a Day With Meals., Disp: , Rfl:     lisinopril-hydrochlorothiazide (PRINZIDE,ZESTORETIC) 10-12.5 MG per tablet, Take 1 tablet by mouth Daily., Disp: 90 tablet, Rfl: 3    multivitamin with minerals tablet tablet, multivitamin oral tablet take 1 tablet by oral route daily   Active, Disp: , Rfl:     ondansetron ODT (ZOFRAN-ODT) 4 MG disintegrating tablet, Place 1 tablet on the tongue Every 8 (Eight) Hours As Needed for Vomiting or Nausea., Disp: 20 tablet, Rfl: 0    " "pantoprazole (PROTONIX) 40 MG EC tablet, Take 1 tablet by mouth Daily for 30 days., Disp: 30 tablet, Rfl: 2    sucralfate (CARAFATE) 1 g tablet, Take 1 tablet by mouth 4 (Four) Times a Day for 30 days., Disp: 120 tablet, Rfl: 2    cephalexin (KEFLEX) 500 MG capsule, Take 1 capsule by mouth 2 (Two) Times a Day for 7 days. (Patient not taking: Reported on 6/11/2024), Disp: 14 capsule, Rfl: 0    Sod Picosulfate-Mag Ox-Cit Acd (Clenpiq) 10-3.5-12 MG-GM -GM/160ML solution, Take 175 mL by mouth 1 (One) Time for 1 dose. As directed by office., Disp: 350 mL, Rfl: 0     No Known Allergies    Family History   Problem Relation Age of Onset    Cancer Mother     Diabetes Father     Diabetes Sister     Colon cancer Neg Hx         Social History     Social History Narrative    Not on file       Objective       Objective     Vital Signs:   /62 (BP Location: Left arm, Patient Position: Sitting, Cuff Size: Adult)   Pulse 65   Ht 157.5 cm (62\")   Wt 81.8 kg (180 lb 6.4 oz)   SpO2 100%   BMI 33.00 kg/m²     Body mass index is 33 kg/m².    Physical Exam  Constitutional:       Appearance: Normal appearance.   Pulmonary:      Effort: Pulmonary effort is normal.   Neurological:      General: No focal deficit present.      Mental Status: She is alert and oriented to person, place, and time.   Psychiatric:         Mood and Affect: Mood normal.         Behavior: Behavior normal.              Assessment & Plan          Assessment and Plan    Diagnoses and all orders for this visit:    1. Gastroesophageal reflux disease, unspecified whether esophagitis present (Primary)  -     Case Request; Standing  -     Verify NPO; Standing  -     Verify Bowel Prep Was Successful; Standing  -     Give Tap Water Enema If Bowel Prep Insufficient; Standing  -     Obtain Informed Consent; Standing  -     Follow Anesthesia Guidelines / Protocol; Standing  -     Case Request    2. Nausea  -     Case Request; Standing  -     Verify NPO; Standing  -     " Verify Bowel Prep Was Successful; Standing  -     Give Tap Water Enema If Bowel Prep Insufficient; Standing  -     Obtain Informed Consent; Standing  -     Follow Anesthesia Guidelines / Protocol; Standing  -     Case Request    3. Nausea and vomiting, unspecified vomiting type  -     Case Request; Standing  -     Verify NPO; Standing  -     Verify Bowel Prep Was Successful; Standing  -     Give Tap Water Enema If Bowel Prep Insufficient; Standing  -     Obtain Informed Consent; Standing  -     Follow Anesthesia Guidelines / Protocol; Standing  -     Case Request    4. Altered bowel habits  -     Case Request; Standing  -     Verify NPO; Standing  -     Verify Bowel Prep Was Successful; Standing  -     Give Tap Water Enema If Bowel Prep Insufficient; Standing  -     Obtain Informed Consent; Standing  -     Follow Anesthesia Guidelines / Protocol; Standing  -     Case Request    5. Diarrhea, unspecified type  -     Case Request; Standing  -     Verify NPO; Standing  -     Verify Bowel Prep Was Successful; Standing  -     Give Tap Water Enema If Bowel Prep Insufficient; Standing  -     Obtain Informed Consent; Standing  -     Follow Anesthesia Guidelines / Protocol; Standing  -     Case Request    6. Abnormal CT of the abdomen  -     Case Request; Standing  -     Verify NPO; Standing  -     Verify Bowel Prep Was Successful; Standing  -     Give Tap Water Enema If Bowel Prep Insufficient; Standing  -     Obtain Informed Consent; Standing  -     Follow Anesthesia Guidelines / Protocol; Standing  -     Case Request    7. Duodenitis  -     Case Request; Standing  -     Verify NPO; Standing  -     Verify Bowel Prep Was Successful; Standing  -     Give Tap Water Enema If Bowel Prep Insufficient; Standing  -     Obtain Informed Consent; Standing  -     Follow Anesthesia Guidelines / Protocol; Standing  -     Case Request    8. Generalized abdominal pain  -     Case Request; Standing  -     Verify NPO; Standing  -     Verify  Bowel Prep Was Successful; Standing  -     Give Tap Water Enema If Bowel Prep Insufficient; Standing  -     Obtain Informed Consent; Standing  -     Follow Anesthesia Guidelines / Protocol; Standing  -     Case Request    Other orders  -     pantoprazole (PROTONIX) 40 MG EC tablet; Take 1 tablet by mouth Daily for 30 days.  Dispense: 30 tablet; Refill: 2  -     sucralfate (CARAFATE) 1 g tablet; Take 1 tablet by mouth 4 (Four) Times a Day for 30 days.  Dispense: 120 tablet; Refill: 2  -     Sod Picosulfate-Mag Ox-Cit Acd (Clenpiq) 10-3.5-12 MG-GM -GM/160ML solution; Take 175 mL by mouth 1 (One) Time for 1 dose. As directed by office.  Dispense: 350 mL; Refill: 0        68-year-old female presenting to the office today as a new patient with a history of abdominal pain, nausea, vomiting, altered bowel movements, abnormal imaging of the abdomen and ulcers with use of NSAIDs.  I have recommended that the patient undergo further evaluation with a colonoscopy and EGD.  I have discussed this procedure in detail with the patient.  I have discussed the risks, benefits and alternatives.  I have discussed the risk of anesthesia, bleeding and perforation.  Patient understands these risks, benefits and alternatives and wishes to proceed.  I will schedule her at her earliest convenience.  I have refilled the patient's Protonix and Carafate.  I have advised her to avoid NSAIDs.  Patient agreeable to this plan and will call with any questions or concerns.           Follow Up       Follow Up   Return for Follow up after endoscopy in office.  Patient was given instructions and counseling regarding her condition or for health maintenance advice. Please see specific information pulled into the AVS if appropriate.

## 2024-06-11 ENCOUNTER — OFFICE VISIT (OUTPATIENT)
Dept: GASTROENTEROLOGY | Facility: CLINIC | Age: 69
End: 2024-06-11
Payer: MEDICARE

## 2024-06-11 VITALS
HEIGHT: 62 IN | SYSTOLIC BLOOD PRESSURE: 151 MMHG | DIASTOLIC BLOOD PRESSURE: 62 MMHG | HEART RATE: 65 BPM | BODY MASS INDEX: 33.2 KG/M2 | OXYGEN SATURATION: 100 % | WEIGHT: 180.4 LBS

## 2024-06-11 DIAGNOSIS — R11.2 NAUSEA AND VOMITING, UNSPECIFIED VOMITING TYPE: ICD-10-CM

## 2024-06-11 DIAGNOSIS — R10.84 GENERALIZED ABDOMINAL PAIN: ICD-10-CM

## 2024-06-11 DIAGNOSIS — K21.9 GASTROESOPHAGEAL REFLUX DISEASE, UNSPECIFIED WHETHER ESOPHAGITIS PRESENT: Primary | ICD-10-CM

## 2024-06-11 DIAGNOSIS — R11.0 NAUSEA: ICD-10-CM

## 2024-06-11 DIAGNOSIS — K29.80 DUODENITIS: ICD-10-CM

## 2024-06-11 DIAGNOSIS — R93.5 ABNORMAL CT OF THE ABDOMEN: ICD-10-CM

## 2024-06-11 DIAGNOSIS — R19.7 DIARRHEA, UNSPECIFIED TYPE: ICD-10-CM

## 2024-06-11 DIAGNOSIS — R19.4 ALTERED BOWEL HABITS: ICD-10-CM

## 2024-06-11 RX ORDER — SODIUM PICOSULFATE, MAGNESIUM OXIDE, AND ANHYDROUS CITRIC ACID 10; 3.5; 12 MG/160ML; G/160ML; G/160ML
175 LIQUID ORAL ONCE
Qty: 350 ML | Refills: 0 | Status: SHIPPED | OUTPATIENT
Start: 2024-06-11 | End: 2024-06-17 | Stop reason: SDUPTHER

## 2024-06-11 RX ORDER — PANTOPRAZOLE SODIUM 40 MG/1
40 TABLET, DELAYED RELEASE ORAL DAILY
Qty: 30 TABLET | Refills: 2 | Status: SHIPPED | OUTPATIENT
Start: 2024-06-11 | End: 2024-07-11

## 2024-06-11 RX ORDER — SUCRALFATE 1 G/1
1 TABLET ORAL 4 TIMES DAILY
Qty: 120 TABLET | Refills: 2 | Status: SHIPPED | OUTPATIENT
Start: 2024-06-11 | End: 2024-07-11

## 2024-06-12 ENCOUNTER — HOSPITAL ENCOUNTER (OUTPATIENT)
Dept: MAMMOGRAPHY | Facility: HOSPITAL | Age: 69
Discharge: HOME | End: 2024-06-12
Admitting: FAMILY MEDICINE
Payer: MEDICARE

## 2024-06-12 DIAGNOSIS — Z12.31 VISIT FOR SCREENING MAMMOGRAM: ICD-10-CM

## 2024-06-12 PROCEDURE — 77067 SCR MAMMO BI INCL CAD: CPT

## 2024-06-12 PROCEDURE — 77063 BREAST TOMOSYNTHESIS BI: CPT

## 2024-06-13 NOTE — PRE-PROCEDURE INSTRUCTIONS
Instructed on date and arrival time of 100 Come to entrance 'C'. Must have a  over age of 18 to drive home.May have two visitors; however children under the age of 12 must stay in waiting room. Discussed clear liquid diet(no red or purple), bowel prep,and NPO. May take medications as usual except for blood thinners,diabetic medications, and weight loss medications.verbalizes understanding of instructions given.Instructed to call for questions or concerns.

## 2024-06-14 ENCOUNTER — PATIENT ROUNDING (BHMG ONLY) (OUTPATIENT)
Dept: GASTROENTEROLOGY | Facility: CLINIC | Age: 69
End: 2024-06-14
Payer: MEDICARE

## 2024-06-14 NOTE — PROGRESS NOTES
6/14/2024      Hello, may I speak with Pily Rice     My name is Jameson. I am calling from [facility] [place]. I show that you had a recent visit with MYRIAM Avilez.    Before we get started may I verify your date of birth? 1955    I am calling to officially welcome you to our practice and ask about your recent visit. Is this a good time to talk? No I left pt a VM     Tell me about your visit with us. What things went well?    We strive to ensure that we protect your safety and privacy. Is there anything we could have done to improve this during your visit?        We're always looking for ways to make our patients' experiences even better. Do you have recommendations on ways we may improve?    Overall were you satisfied with your first visit to our practice?    I appreciate you taking the time to speak with me today. Is there anything else I can do for you?    I am glad to hear that you had a very good visit and I appreciate you taking the time to provide feedback on this call. We would greatly appreciate you filling out a survey if you receive one in the mail, email or text. This is a great opportunity to provide any additional feedback that you may think of after this call as well.       Thank you, and have a great day.

## 2024-06-17 RX ORDER — SODIUM PICOSULFATE, MAGNESIUM OXIDE, AND ANHYDROUS CITRIC ACID 12; 3.5; 1 G/175ML; G/175ML; MG/175ML
175 LIQUID ORAL TAKE AS DIRECTED
Qty: 350 ML | Refills: 0 | Status: SHIPPED | OUTPATIENT
Start: 2024-06-17

## 2024-06-18 ENCOUNTER — ANESTHESIA EVENT (OUTPATIENT)
Dept: GASTROENTEROLOGY | Facility: HOSPITAL | Age: 69
End: 2024-06-18
Payer: MEDICARE

## 2024-06-18 NOTE — ANESTHESIA PREPROCEDURE EVALUATION
Anesthesia Evaluation     NPO Solid Status: > 8 hours  NPO Liquid Status: > 2 hours           Airway   Mallampati: II  TM distance: >3 FB  Neck ROM: full  No difficulty expected  Dental - normal exam     Pulmonary - normal exam   Cardiovascular - normal exam    (+) hypertension less than 2 medications, hyperlipidemia      Neuro/Psych  GI/Hepatic/Renal/Endo    (+) obesity, GERD, PUD    Musculoskeletal     Abdominal   (+) obese   Substance History      OB/GYN          Other      history of cancer (history of cervical cancer)                      Anesthesia Plan    ASA 2     general   total IV anesthesia  (Total IV Anesthesia    Patient understands anesthesia not responsible for dental damage.  )  intravenous induction     Anesthetic plan, risks, benefits, and alternatives have been provided, discussed and informed consent has been obtained with: patient.    Plan discussed with CRNA.        CODE STATUS:

## 2024-06-19 ENCOUNTER — HOSPITAL ENCOUNTER (OUTPATIENT)
Facility: HOSPITAL | Age: 69
Setting detail: HOSPITAL OUTPATIENT SURGERY
Discharge: HOME | End: 2024-06-19
Attending: INTERNAL MEDICINE | Admitting: INTERNAL MEDICINE
Payer: MEDICARE

## 2024-06-19 ENCOUNTER — ANESTHESIA (OUTPATIENT)
Dept: GASTROENTEROLOGY | Facility: HOSPITAL | Age: 69
End: 2024-06-19
Payer: MEDICARE

## 2024-06-19 VITALS
SYSTOLIC BLOOD PRESSURE: 130 MMHG | WEIGHT: 176.37 LBS | RESPIRATION RATE: 16 BRPM | TEMPERATURE: 97 F | DIASTOLIC BLOOD PRESSURE: 73 MMHG | BODY MASS INDEX: 32.46 KG/M2 | HEART RATE: 56 BPM | OXYGEN SATURATION: 99 % | HEIGHT: 62 IN

## 2024-06-19 DIAGNOSIS — K29.80 DUODENITIS: ICD-10-CM

## 2024-06-19 DIAGNOSIS — R19.4 ALTERED BOWEL HABITS: ICD-10-CM

## 2024-06-19 DIAGNOSIS — R11.2 NAUSEA AND VOMITING, UNSPECIFIED VOMITING TYPE: ICD-10-CM

## 2024-06-19 DIAGNOSIS — R93.5 ABNORMAL CT OF THE ABDOMEN: ICD-10-CM

## 2024-06-19 DIAGNOSIS — R10.84 GENERALIZED ABDOMINAL PAIN: ICD-10-CM

## 2024-06-19 DIAGNOSIS — R19.7 DIARRHEA, UNSPECIFIED TYPE: ICD-10-CM

## 2024-06-19 DIAGNOSIS — R11.0 NAUSEA: ICD-10-CM

## 2024-06-19 DIAGNOSIS — K21.9 GASTROESOPHAGEAL REFLUX DISEASE, UNSPECIFIED WHETHER ESOPHAGITIS PRESENT: ICD-10-CM

## 2024-06-19 PROCEDURE — 88305 TISSUE EXAM BY PATHOLOGIST: CPT | Performed by: INTERNAL MEDICINE

## 2024-06-19 PROCEDURE — 25010000002 PROPOFOL 10 MG/ML EMULSION

## 2024-06-19 PROCEDURE — 25010000002 PROPOFOL 500 MG/50ML EMULSION

## 2024-06-19 PROCEDURE — 25810000003 LACTATED RINGERS PER 1000 ML: Performed by: NURSE ANESTHETIST, CERTIFIED REGISTERED

## 2024-06-19 RX ORDER — PROPOFOL 10 MG/ML
INJECTION, EMULSION INTRAVENOUS AS NEEDED
Status: DISCONTINUED | OUTPATIENT
Start: 2024-06-19 | End: 2024-06-19

## 2024-06-19 RX ORDER — PROPOFOL 10 MG/ML
VIAL (ML) INTRAVENOUS AS NEEDED
Status: DISCONTINUED | OUTPATIENT
Start: 2024-06-19 | End: 2024-06-19 | Stop reason: SURG

## 2024-06-19 RX ORDER — SODIUM CHLORIDE, SODIUM LACTATE, POTASSIUM CHLORIDE, CALCIUM CHLORIDE 600; 310; 30; 20 MG/100ML; MG/100ML; MG/100ML; MG/100ML
30 INJECTION, SOLUTION INTRAVENOUS CONTINUOUS
Status: DISCONTINUED | OUTPATIENT
Start: 2024-06-19 | End: 2024-06-19 | Stop reason: HOSPADM

## 2024-06-19 RX ORDER — PROPOFOL 10 MG/ML
INJECTION, EMULSION INTRAVENOUS CONTINUOUS PRN
Status: DISCONTINUED | OUTPATIENT
Start: 2024-06-19 | End: 2024-06-19 | Stop reason: SURG

## 2024-06-19 RX ORDER — LIDOCAINE HYDROCHLORIDE 20 MG/ML
INJECTION, SOLUTION EPIDURAL; INFILTRATION; INTRACAUDAL; PERINEURAL AS NEEDED
Status: DISCONTINUED | OUTPATIENT
Start: 2024-06-19 | End: 2024-06-19 | Stop reason: SURG

## 2024-06-19 RX ADMIN — LIDOCAINE HYDROCHLORIDE 60 MG: 20 INJECTION, SOLUTION EPIDURAL; INFILTRATION; INTRACAUDAL; PERINEURAL at 12:36

## 2024-06-19 RX ADMIN — PROPOFOL 200 MCG/KG/MIN: 10 INJECTION, EMULSION INTRAVENOUS at 12:37

## 2024-06-19 RX ADMIN — PROPOFOL 70 MG: 10 INJECTION, EMULSION INTRAVENOUS at 12:36

## 2024-06-19 RX ADMIN — SODIUM CHLORIDE, POTASSIUM CHLORIDE, SODIUM LACTATE AND CALCIUM CHLORIDE 30 ML/HR: 600; 310; 30; 20 INJECTION, SOLUTION INTRAVENOUS at 11:06

## 2024-06-22 LAB
CYTO UR: NORMAL
LAB AP CASE REPORT: NORMAL
LAB AP CLINICAL INFORMATION: NORMAL
PATH REPORT.FINAL DX SPEC: NORMAL
PATH REPORT.GROSS SPEC: NORMAL

## 2024-06-24 ENCOUNTER — TELEPHONE (OUTPATIENT)
Dept: GASTROENTEROLOGY | Facility: CLINIC | Age: 69
End: 2024-06-24
Payer: MEDICARE

## 2024-06-24 RX ORDER — BUDESONIDE 3 MG/1
CAPSULE, COATED PELLETS ORAL
Qty: 161 CAPSULE | Refills: 0 | Status: SHIPPED | OUTPATIENT
Start: 2024-06-24 | End: 2024-09-22

## 2024-06-24 NOTE — TELEPHONE ENCOUNTER
Attempted to contact patient, left voicemail message to return call. Provided direct contact information.    Advised that medication has been sent to preferred pharmacy.  Would like to further discuss.    Follow up appointment is with MYRIAM Avilez on 09.20.24 at 1115.

## 2024-06-24 NOTE — TELEPHONE ENCOUNTER
Patient returned call.    Spoke to patient and informed of MYRIAM Avilez result note and recommendations. Verified patient understanding.    Educated on importance of taking medication as prescribed as well as completing the entire dose.    Confirmed follow up appointment with MYRIAM Avilez on 09.20.24.    Advised patient to reach out as needed.  Patient verbalized understanding.

## 2024-06-24 NOTE — TELEPHONE ENCOUNTER
----- Message from Gayla Loyola sent at 6/24/2024  1:05 PM EDT -----  Patient's colon biopsy consistent with collagenous colitis.  This is an inflammatory disease of the colon that can cause worsening diarrhea.  We are able to make this diagnosis using biopsies of the colon tissue.  NSAIDs such as ibuprofen, Aleve, Advil can trigger a flare of collagenous colitis.  Have the patient avoid NSAIDs if possible.  Smoking can also cause triggering and colitis flareup, if the patient is a smoker I have patient work towards smoking cessation.  For treatment of collagenous colitis we use a budesonide taper which is an oral steroid that has low systemic exposure and does help to reduce the inflammation in the colon.  I have sent the budesonide taper to the pharmacy.  Patient can also use antidiarrheal such as Imodium as needed.  Ensure patient maintains office follow-up.    I have reviewed upper endoscopy. Negative results for H. Pylori, metaplasia, dysplasia and malignancy.

## 2024-07-04 DIAGNOSIS — M17.0 PRIMARY OSTEOARTHRITIS OF BOTH KNEES: ICD-10-CM

## 2024-07-04 DIAGNOSIS — M25.561 PAIN IN BOTH KNEES, UNSPECIFIED CHRONICITY: ICD-10-CM

## 2024-07-04 DIAGNOSIS — M25.562 PAIN IN BOTH KNEES, UNSPECIFIED CHRONICITY: ICD-10-CM

## 2024-07-05 RX ORDER — MELOXICAM 15 MG/1
TABLET ORAL
Qty: 90 TABLET | Refills: 0 | OUTPATIENT
Start: 2024-07-05

## 2024-07-19 ENCOUNTER — TELEPHONE (OUTPATIENT)
Dept: GASTROENTEROLOGY | Facility: CLINIC | Age: 69
End: 2024-07-19
Payer: MEDICARE

## 2024-07-19 NOTE — TELEPHONE ENCOUNTER
Entered from paper copy due to system outage-  Budesonide EC 3 mg caps  RX sent incorrectly (per Pt)  Needs 71 more caps  -------  Routed to clinical pool for follow up

## 2024-07-22 RX ORDER — BUDESONIDE 3 MG/1
CAPSULE, COATED PELLETS ORAL
Qty: 90 CAPSULE | Refills: 1 | Status: SHIPPED | OUTPATIENT
Start: 2024-07-22

## 2024-09-20 ENCOUNTER — DOCUMENTATION (OUTPATIENT)
Dept: PHYSICAL THERAPY | Facility: CLINIC | Age: 69
End: 2024-09-20
Payer: MEDICARE

## 2024-09-20 ENCOUNTER — OFFICE VISIT (OUTPATIENT)
Dept: GASTROENTEROLOGY | Facility: CLINIC | Age: 69
End: 2024-09-20
Payer: MEDICARE

## 2024-09-20 VITALS
DIASTOLIC BLOOD PRESSURE: 66 MMHG | WEIGHT: 180.5 LBS | OXYGEN SATURATION: 98 % | BODY MASS INDEX: 33.21 KG/M2 | SYSTOLIC BLOOD PRESSURE: 139 MMHG | HEART RATE: 57 BPM | HEIGHT: 62 IN

## 2024-09-20 DIAGNOSIS — R19.4 ALTERED BOWEL HABITS: ICD-10-CM

## 2024-09-20 DIAGNOSIS — R11.2 NAUSEA AND VOMITING, UNSPECIFIED VOMITING TYPE: ICD-10-CM

## 2024-09-20 DIAGNOSIS — R10.84 GENERALIZED ABDOMINAL PAIN: ICD-10-CM

## 2024-09-20 DIAGNOSIS — R19.7 DIARRHEA, UNSPECIFIED TYPE: ICD-10-CM

## 2024-09-20 DIAGNOSIS — K29.80 DUODENITIS: ICD-10-CM

## 2024-09-20 DIAGNOSIS — R93.5 ABNORMAL CT OF THE ABDOMEN: ICD-10-CM

## 2024-09-20 DIAGNOSIS — K52.831 COLLAGENOUS COLITIS: ICD-10-CM

## 2024-09-20 DIAGNOSIS — K21.9 GASTROESOPHAGEAL REFLUX DISEASE, UNSPECIFIED WHETHER ESOPHAGITIS PRESENT: Primary | ICD-10-CM

## 2024-09-20 RX ORDER — PANTOPRAZOLE SODIUM 40 MG/1
1 TABLET, DELAYED RELEASE ORAL DAILY
COMMUNITY
Start: 2024-07-01

## 2024-09-20 RX ORDER — SUCRALFATE 1 G/1
TABLET ORAL
COMMUNITY
Start: 2024-07-01

## 2024-09-30 RX ORDER — PANTOPRAZOLE SODIUM 40 MG/1
40 TABLET, DELAYED RELEASE ORAL DAILY
Qty: 90 TABLET | Refills: 1 | Status: SHIPPED | OUTPATIENT
Start: 2024-09-30

## 2025-01-13 ENCOUNTER — OFFICE VISIT (OUTPATIENT)
Dept: FAMILY MEDICINE CLINIC | Facility: CLINIC | Age: 70
End: 2025-01-13
Payer: MEDICARE

## 2025-01-13 VITALS
HEIGHT: 62 IN | SYSTOLIC BLOOD PRESSURE: 122 MMHG | BODY MASS INDEX: 32.85 KG/M2 | HEART RATE: 61 BPM | WEIGHT: 178.5 LBS | DIASTOLIC BLOOD PRESSURE: 70 MMHG | OXYGEN SATURATION: 97 % | TEMPERATURE: 98.3 F

## 2025-01-13 DIAGNOSIS — Z00.00 MEDICARE ANNUAL WELLNESS VISIT, SUBSEQUENT: Primary | ICD-10-CM

## 2025-01-13 DIAGNOSIS — Z12.31 VISIT FOR SCREENING MAMMOGRAM: ICD-10-CM

## 2025-01-13 DIAGNOSIS — G89.29 CHRONIC BILATERAL LOW BACK PAIN WITH RIGHT-SIDED SCIATICA: ICD-10-CM

## 2025-01-13 DIAGNOSIS — K52.9 COLITIS: ICD-10-CM

## 2025-01-13 DIAGNOSIS — I10 PRIMARY HYPERTENSION: ICD-10-CM

## 2025-01-13 DIAGNOSIS — K21.9 GASTROESOPHAGEAL REFLUX DISEASE, UNSPECIFIED WHETHER ESOPHAGITIS PRESENT: ICD-10-CM

## 2025-01-13 DIAGNOSIS — N20.0 NEPHROLITHIASIS: ICD-10-CM

## 2025-01-13 DIAGNOSIS — Z23 NEED FOR TDAP VACCINATION: ICD-10-CM

## 2025-01-13 DIAGNOSIS — M54.41 CHRONIC BILATERAL LOW BACK PAIN WITH RIGHT-SIDED SCIATICA: ICD-10-CM

## 2025-01-13 DIAGNOSIS — E78.2 MIXED HYPERLIPIDEMIA: ICD-10-CM

## 2025-01-13 DIAGNOSIS — R73.09 ABNORMAL GLUCOSE: ICD-10-CM

## 2025-01-13 DIAGNOSIS — M54.16 LUMBAR RADICULOPATHY: ICD-10-CM

## 2025-01-13 DIAGNOSIS — R19.7 DIARRHEA, UNSPECIFIED TYPE: ICD-10-CM

## 2025-01-13 PROCEDURE — 99213 OFFICE O/P EST LOW 20 MIN: CPT | Performed by: FAMILY MEDICINE

## 2025-01-13 PROCEDURE — 1125F AMNT PAIN NOTED PAIN PRSNT: CPT | Performed by: FAMILY MEDICINE

## 2025-01-13 PROCEDURE — G0439 PPPS, SUBSEQ VISIT: HCPCS | Performed by: FAMILY MEDICINE

## 2025-01-13 RX ORDER — ATORVASTATIN CALCIUM 10 MG/1
10 TABLET, FILM COATED ORAL DAILY
Qty: 90 TABLET | Refills: 3 | Status: SHIPPED | OUTPATIENT
Start: 2025-01-13

## 2025-01-13 RX ORDER — LISINOPRIL AND HYDROCHLOROTHIAZIDE 10; 12.5 MG/1; MG/1
1 TABLET ORAL DAILY
Qty: 90 TABLET | Refills: 3 | Status: SHIPPED | OUTPATIENT
Start: 2025-01-13

## 2025-01-13 RX ORDER — DULOXETIN HYDROCHLORIDE 20 MG/1
20 CAPSULE, DELAYED RELEASE ORAL DAILY
Qty: 30 CAPSULE | Refills: 2 | Status: SHIPPED | OUTPATIENT
Start: 2025-01-13

## 2025-01-13 NOTE — PROGRESS NOTES
Subjective   The ABCs of the Annual Wellness Visit  Medicare Wellness Visit      Pily Rice is a 69 y.o. patient who presents for a Medicare Wellness Visit.    The following portions of the patient's history were reviewed and   updated as appropriate: allergies, current medications, past family history, past medical history, past social history, past surgical history, and problem list.    Compared to one year ago, the patient's physical   health is the same.  Compared to one year ago, the patient's mental   health is the same.    Recent Hospitalizations:  She was not admitted to the hospital during the last year.     Current Medical Providers:  Patient Care Team:  Arnaldo Thomas DO as PCP - General (Family Medicine)  Gayla Loyola APRN as Nurse Practitioner (Gastroenterology)    Outpatient Medications Prior to Visit   Medication Sig Dispense Refill    calcium carbonate (OS-ALEX) 1250 (500 Ca) MG tablet Calcium 500 500 mg calcium (1,250 mg) oral tablet take 1 tablet by oral route daily   Active      glucosamine-chondroitin 500-400 MG capsule capsule Take  by mouth 3 (Three) Times a Day With Meals.      multivitamin with minerals tablet tablet multivitamin oral tablet take 1 tablet by oral route daily   Active      pantoprazole (PROTONIX) 40 MG EC tablet TAKE 1 TABLET BY MOUTH EVERY DAY 90 tablet 1    sucralfate (CARAFATE) 1 g tablet TAKE 1 TABLET BY MOUTH 4 (FOUR) TIMES A DAY FOR 30 DAYS.      atorvastatin (Lipitor) 10 MG tablet Take 1 tablet by mouth Daily. 90 tablet 3    lisinopril-hydrochlorothiazide (PRINZIDE,ZESTORETIC) 10-12.5 MG per tablet Take 1 tablet by mouth Daily. 90 tablet 3     No facility-administered medications prior to visit.     No opioid medication identified on active medication list. I have reviewed chart for other potential  high risk medication/s and harmful drug interactions in the elderly.      Aspirin is not on active medication list.  Aspirin use is not indicated based on review of  "current medical condition/s. Risk of harm outweighs potential benefits.  .    Patient Active Problem List   Diagnosis    Screening for colon cancer    History of cervical cancer    Gastroesophageal reflux disease    Nausea    Nausea and vomiting    Altered bowel habits    Diarrhea    Abnormal CT of the abdomen    Duodenitis    Generalized abdominal pain     Advance Care Planning Advance Directive is not on file.  ACP discussion was held with the patient during this visit. Patient does not have an advance directive, information provided.            Objective   Vitals:    25 1336   BP: 122/70   Pulse: 61   Temp: 98.3 °F (36.8 °C)   TempSrc: Oral   SpO2: 97%   Weight: 81 kg (178 lb 8 oz)   Height: 157.5 cm (62\")   PainSc:   2   PainLoc: Back       Estimated body mass index is 32.65 kg/m² as calculated from the following:    Height as of this encounter: 157.5 cm (62\").    Weight as of this encounter: 81 kg (178 lb 8 oz).    BMI is >= 30 and <35. (Class 1 Obesity). The following options were offered after discussion;: exercise counseling/recommendations and nutrition counseling/recommendations           Does the patient have evidence of cognitive impairment? No                                                                                                Health  Risk Assessment    Smoking Status:  Social History     Tobacco Use   Smoking Status Never    Passive exposure: Never   Smokeless Tobacco Never     Alcohol Consumption:  Social History     Substance and Sexual Activity   Alcohol Use Never       Fall Risk Screen  STEADI Fall Risk Assessment was completed, and patient is at LOW risk for falls.Assessment completed on:2025    Depression Screening   Little interest or pleasure in doing things? Not at all   Feeling down, depressed, or hopeless? Not at all   PHQ-2 Total Score 0      Health Habits and Functional and Cognitive Screenin/6/2025    10:37 AM   Functional & Cognitive Status   Do you have " difficulty preparing food and eating? No    Do you have difficulty bathing yourself, getting dressed or grooming yourself? No    Do you have difficulty using the toilet? No    Do you have difficulty moving around from place to place? No    Do you have trouble with steps or getting out of a bed or a chair? No    Current Diet Well Balanced Diet    Dental Exam Up to date    Eye Exam Up to date    Exercise (times per week) 0 times per week    Current Exercises Include No Regular Exercise    Do you need help using the phone?  No    Are you deaf or do you have serious difficulty hearing?  No    Do you need help to go to places out of walking distance? No    Do you need help shopping? No    Do you need help preparing meals?  No    Do you need help with housework?  No    Do you need help with laundry? No    Do you need help taking your medications? No    Do you need help managing money? No    Do you ever drive or ride in a car without wearing a seat belt? No    Have you felt unusual stress, anger or loneliness in the last month? No    Who do you live with? Spouse    If you need help, do you have trouble finding someone available to you? No    Have you been bothered in the last four weeks by sexual problems? No    Do you have difficulty concentrating, remembering or making decisions? No        Patient-reported           Age-appropriate Screening Schedule:  Refer to the list below for future screening recommendations based on patient's age, sex and/or medical conditions. Orders for these recommended tests are listed in the plan section. The patient has been provided with a written plan.    Health Maintenance List  Health Maintenance   Topic Date Due    TDAP/TD VACCINES (1 - Tdap) Never done    ZOSTER VACCINE (1 of 2) Never done    Pneumococcal Vaccine 65+ (1 of 1 - PCV) Never done    DXA SCAN  02/14/2024    INFLUENZA VACCINE  07/01/2024    COVID-19 Vaccine (4 - 2024-25 season) 09/01/2024    BMI FOLLOWUP  01/08/2025    LIPID  PANEL  01/12/2025    ANNUAL WELLNESS VISIT  01/13/2026    MAMMOGRAM  06/12/2026    COLORECTAL CANCER SCREENING  06/19/2034    HEPATITIS C SCREENING  Completed                                                                                                                                                CMS Preventative Services Quick Reference  Risk Factors Identified During Encounter  Polypharmacy: Medication List reviewed    The above risks/problems have been discussed with the patient.  Pertinent information has been shared with the patient in the After Visit Summary.  An After Visit Summary and PPPS were made available to the patient.    Follow Up:   Next Medicare Wellness visit to be scheduled in 1 year.          Additional E&M Note during same encounter follows:  Patient has multiple medical problems which are significant and separately identifiable that require additional work above and beyond the Medicare Wellness Visit.      Chief Complaint  Medicare Wellness-subsequent    Pily Rice is a 69 y.o. female who presents to [facility]     History of Present Illness  The patient is a 69-year-old female who presents today for a Medicare annual wellness visit.    She reports no significant changes in her physical or mental health status compared to the previous year. She has not required hospitalization within the past year. She maintains an active lifestyle and has not experienced any falls. She does not have an advanced directive in place. She underwent colorectal cancer screening in June 2024, which yielded normal results. Her last laboratory tests were conducted a year ago. She has no current breast-related concerns.    She experienced an ulcer in May 2024, which necessitated an emergency room visit due to severe pain. During this visit, she underwent both upper endoscopy and colonoscopy. A CT scan performed during her ER visit revealed the presence of an ulcer. She has been  "under the care of Gayla Loyola since September 2024. She has been experiencing intermittent episodes of diarrhea, with an increase in frequency over the past month. She suspects that her current diarrhea may be food-related, as it began after a meal on Saturday night. She also reports excessive gas and burping. She has not recently taken any antibiotics. She recalls a similar episode of diarrhea on 01/01/2025, following the consumption of a familiar food item. She prefers to monitor her symptoms before considering stool studies or food allergy testing. She is currently on a regimen of sucralfate, administered once daily.    She has been dealing with sciatica for approximately 7 to 8 years, characterized by shooting pain from her right buttock down to her toes, accompanied by numbness, tingling, and burning sensations. She also experiences low back pain, which is more pronounced on the right side. She has sought chiropractic care from [facility] in the past, but found the treatments unhelpful and uncomfortable. She has not undergone any imaging studies for her back. She occasionally resorts to Motrin for pain management, despite being aware of its potential to exacerbate her colitis. She reports no weakness in her right leg, attributing any perceived weakness to her age and lack of exercise. She is not interested in physical therapy at this time.    She is currently on lisinopril/hydrochlorothiazide 10/12.5 for blood pressure management, atorvastatin 10 for cholesterol control, and Protonix for acid reflux. She does not require any medication refills at this time.    MEDICATIONS  lisinopril/hydrochlorothiazide 10/12.5, atorvastatin 10, Protonix, sucralfate    Objective   Vital Signs:   Vitals:    01/13/25 1336   BP: 122/70   Pulse: 61   Temp: 98.3 °F (36.8 °C)   TempSrc: Oral   SpO2: 97%   Weight: 81 kg (178 lb 8 oz)   Height: 157.5 cm (62\")   PainSc:   2   PainLoc: Back       Wt Readings from Last 3 Encounters:   01/13/25 " 81 kg (178 lb 8 oz)   09/20/24 81.9 kg (180 lb 8 oz)   06/19/24 80 kg (176 lb 5.9 oz)     BP Readings from Last 3 Encounters:   01/13/25 122/70   09/20/24 139/66   06/19/24 130/73       Physical Exam  Vitals reviewed.   Constitutional:       Appearance: Normal appearance.   HENT:      Head: Normocephalic and atraumatic.      Right Ear: External ear normal.      Left Ear: External ear normal.      Nose: Nose normal.   Eyes:      Conjunctiva/sclera: Conjunctivae normal.   Cardiovascular:      Rate and Rhythm: Normal rate and regular rhythm.      Heart sounds: No murmur heard.     No friction rub. No gallop.   Pulmonary:      Effort: Pulmonary effort is normal.      Breath sounds: Normal breath sounds. No wheezing or rhonchi.   Abdominal:      General: Bowel sounds are normal. There is no distension.      Palpations: Abdomen is soft.      Tenderness: There is no abdominal tenderness.   Musculoskeletal:      Comments: Positive straight leg raise noted on the right.  Paraspinal hypertonicity noted of the lumbar spine.   Skin:     General: Skin is warm and dry.   Neurological:      Mental Status: She is alert and oriented to person, place, and time.      Cranial Nerves: No cranial nerve deficit.   Psychiatric:         Mood and Affect: Mood and affect normal.         Behavior: Behavior normal.         Thought Content: Thought content normal.         Judgment: Judgment normal.         Physical Exam  Lungs were auscultated.  Heart was examined.  Back was examined.    Vital Signs  Blood pressure is normal.    The following data was reviewed by Arnaldo Thomas DO on 01/13/2025  Common Labs   Common labs          6/4/2024    10:48   Common Labs   Glucose 120    BUN 30    Creatinine 1.02    Sodium 138    Potassium 3.3    Chloride 98    Calcium 9.5    Albumin 4.1    Total Bilirubin 0.5    Alkaline Phosphatase 85    AST (SGOT) 23    ALT (SGPT) 19    WBC 13.13    Hemoglobin 13.7    Hematocrit 41.2    Platelets 446         Results  Imaging  CT scan showed a couple of kidney stones on the left side, one is 9 mm and the other one is 5 mm.        Assessment & Plan   Diagnoses and all orders for this visit:    1. Medicare annual wellness visit, subsequent (Primary)    2. Gastroesophageal reflux disease, unspecified whether esophagitis present    3. Need for Tdap vaccination    4. Colitis    5. Diarrhea, unspecified type    6. Visit for screening mammogram  -     Mammo Screening Digital Tomosynthesis Bilateral With CAD; Future    7. Mixed hyperlipidemia  -     Lipid Panel; Future    8. Abnormal glucose  -     Hemoglobin A1c; Future    9. Nephrolithiasis    10. Primary hypertension  -     CBC & Differential; Future  -     Comprehensive Metabolic Panel; Future    11. Chronic bilateral low back pain with right-sided sciatica  -     XR Spine Lumbar 4+ View; Future    12. Lumbar radiculopathy  -     XR Spine Lumbar 4+ View; Future    Other orders  -     atorvastatin (Lipitor) 10 MG tablet; Take 1 tablet by mouth Daily.  Dispense: 90 tablet; Refill: 3  -     lisinopril-hydrochlorothiazide (PRINZIDE,ZESTORETIC) 10-12.5 MG per tablet; Take 1 tablet by mouth Daily.  Dispense: 90 tablet; Refill: 3  -     DULoxetine (CYMBALTA) 20 MG capsule; Take 1 capsule by mouth Daily.  Dispense: 30 capsule; Refill: 2        Assessment & Plan  1. Medicare annual wellness visit.  Her blood pressure readings are within the normal range today. She has expressed a preference to receive her tetanus vaccine at the pharmacy. Her colonoscopy results were unremarkable. She has been diagnosed with nonerosive duodenitis and collagenous colitis. She has a history of elevated blood glucose levels, although these were recorded during periods of illness. She has renal calculi on the left side, measuring 9 mm and 5 mm, respectively, which are currently asymptomatic. A mammogram has been scheduled for June 2025. Laboratory tests, including CBC, CMP, lipid panel, and A1c,  will be ordered. The renal calculi will be monitored, and she has been advised to report any onset of flank pain or dysuria.    2. Chronic low back pain.  Her symptoms suggest a possible nerve impingement in the lower extremity. An x-ray of the lumbar spine will be ordered, followed by an MRI if necessary. She has been advised to avoid NSAIDs due to their potential to exacerbate her colitis. A prescription for duloxetine 30 mg, to be taken once daily, has been provided. She has been encouraged to use Tylenol for pain management. If her symptoms persist, gabapentin may be considered as an alternative treatment option.    3. Hyperlipidemia.  She is currently taking atorvastatin 10 mg for cholesterol management. No changes to her medication regimen are needed at this time.    4. Hypertension.  She is currently taking lisinopril/hydrochlorothiazide 10/12.5 mg for blood pressure management. Her blood pressure is well-controlled. No changes to her medication regimen are needed at this time.    5. Gastroesophageal reflux disease (GERD).  She is taking Protonix as needed for acid reflux, which has been effective. No changes to her medication regimen are needed at this time.    6. Collagenous colitis.  She has been experiencing bouts of diarrhea, which she believes are food-related. She has been advised to avoid NSAIDs. A budesonide taper has been sent to the pharmacy, and she can use antidiarrheals such as Imodium as needed. If symptoms persist, stool studies and food allergy testing may be considered.    7. Nonerosive duodenitis.  She has been diagnosed with nonerosive duodenitis, which appears to be improving. She is currently taking sucralfate once a day as needed.    Follow-up  The patient is scheduled for a follow-up visit in 3 months.    PROCEDURE  The patient underwent both upper endoscopy and colonoscopy in May 2024, which yielded normal results.           FOLLOW UP  Return in about 3 months (around 4/13/2025) for  chronic low back pain.  Patient was given instructions and counseling regarding her condition or for health maintenance advice. Please see specific information pulled into the AVS if appropriate.     Patient or patient representative verbalized consent for the use of Ambient Listening during the visit with  Arnaldo Thomas DO for chart documentation. 1/13/2025  14:11 EST    Arnaldo Thomas DO  01/13/25  14:37 EST

## 2025-01-15 ENCOUNTER — LAB (OUTPATIENT)
Dept: LAB | Facility: HOSPITAL | Age: 70
End: 2025-01-15
Payer: MEDICARE

## 2025-01-15 ENCOUNTER — HOSPITAL ENCOUNTER (OUTPATIENT)
Dept: GENERAL RADIOLOGY | Facility: HOSPITAL | Age: 70
Discharge: HOME | End: 2025-01-15
Payer: MEDICARE

## 2025-01-15 DIAGNOSIS — M54.16 LUMBAR RADICULOPATHY: ICD-10-CM

## 2025-01-15 DIAGNOSIS — G89.29 CHRONIC BILATERAL LOW BACK PAIN WITH RIGHT-SIDED SCIATICA: ICD-10-CM

## 2025-01-15 DIAGNOSIS — I10 PRIMARY HYPERTENSION: ICD-10-CM

## 2025-01-15 DIAGNOSIS — M54.41 CHRONIC BILATERAL LOW BACK PAIN WITH RIGHT-SIDED SCIATICA: ICD-10-CM

## 2025-01-15 DIAGNOSIS — R73.09 ABNORMAL GLUCOSE: ICD-10-CM

## 2025-01-15 DIAGNOSIS — E78.2 MIXED HYPERLIPIDEMIA: ICD-10-CM

## 2025-01-15 LAB
ALBUMIN SERPL-MCNC: 4.2 G/DL (ref 3.5–5.2)
ALBUMIN/GLOB SERPL: 1.2 G/DL
ALP SERPL-CCNC: 87 U/L (ref 39–117)
ALT SERPL W P-5'-P-CCNC: 13 U/L (ref 1–33)
ANION GAP SERPL CALCULATED.3IONS-SCNC: 14 MMOL/L (ref 5–15)
AST SERPL-CCNC: 19 U/L (ref 1–32)
BASOPHILS # BLD AUTO: 0.06 10*3/MM3 (ref 0–0.2)
BASOPHILS NFR BLD AUTO: 0.7 % (ref 0–1.5)
BILIRUB SERPL-MCNC: 0.3 MG/DL (ref 0–1.2)
BUN SERPL-MCNC: 20 MG/DL (ref 8–23)
BUN/CREAT SERPL: 19 (ref 7–25)
CALCIUM SPEC-SCNC: 10.9 MG/DL (ref 8.6–10.5)
CHLORIDE SERPL-SCNC: 104 MMOL/L (ref 98–107)
CHOLEST SERPL-MCNC: 171 MG/DL (ref 0–200)
CO2 SERPL-SCNC: 27 MMOL/L (ref 22–29)
CREAT SERPL-MCNC: 1.05 MG/DL (ref 0.57–1)
DEPRECATED RDW RBC AUTO: 43 FL (ref 37–54)
EGFRCR SERPLBLD CKD-EPI 2021: 57.3 ML/MIN/1.73
EOSINOPHIL # BLD AUTO: 0.25 10*3/MM3 (ref 0–0.4)
EOSINOPHIL NFR BLD AUTO: 3 % (ref 0.3–6.2)
ERYTHROCYTE [DISTWIDTH] IN BLOOD BY AUTOMATED COUNT: 12.6 % (ref 12.3–15.4)
GLOBULIN UR ELPH-MCNC: 3.4 GM/DL
GLUCOSE SERPL-MCNC: 88 MG/DL (ref 65–99)
HBA1C MFR BLD: 5.7 % (ref 4.8–5.6)
HCT VFR BLD AUTO: 40 % (ref 34–46.6)
HDLC SERPL-MCNC: 52 MG/DL (ref 40–60)
HGB BLD-MCNC: 12.6 G/DL (ref 12–15.9)
IMM GRANULOCYTES # BLD AUTO: 0.03 10*3/MM3 (ref 0–0.05)
IMM GRANULOCYTES NFR BLD AUTO: 0.4 % (ref 0–0.5)
LDLC SERPL CALC-MCNC: 93 MG/DL (ref 0–100)
LDLC/HDLC SERPL: 1.72 {RATIO}
LYMPHOCYTES # BLD AUTO: 1.68 10*3/MM3 (ref 0.7–3.1)
LYMPHOCYTES NFR BLD AUTO: 19.8 % (ref 19.6–45.3)
MCH RBC QN AUTO: 29.2 PG (ref 26.6–33)
MCHC RBC AUTO-ENTMCNC: 31.5 G/DL (ref 31.5–35.7)
MCV RBC AUTO: 92.6 FL (ref 79–97)
MONOCYTES # BLD AUTO: 0.68 10*3/MM3 (ref 0.1–0.9)
MONOCYTES NFR BLD AUTO: 8 % (ref 5–12)
NEUTROPHILS NFR BLD AUTO: 5.77 10*3/MM3 (ref 1.7–7)
NEUTROPHILS NFR BLD AUTO: 68.1 % (ref 42.7–76)
NRBC BLD AUTO-RTO: 0 /100 WBC (ref 0–0.2)
PLATELET # BLD AUTO: 411 10*3/MM3 (ref 140–450)
PMV BLD AUTO: 9.6 FL (ref 6–12)
POTASSIUM SERPL-SCNC: 3.5 MMOL/L (ref 3.5–5.2)
PROT SERPL-MCNC: 7.6 G/DL (ref 6–8.5)
RBC # BLD AUTO: 4.32 10*6/MM3 (ref 3.77–5.28)
SODIUM SERPL-SCNC: 145 MMOL/L (ref 136–145)
TRIGL SERPL-MCNC: 149 MG/DL (ref 0–150)
VLDLC SERPL-MCNC: 26 MG/DL (ref 5–40)
WBC NRBC COR # BLD AUTO: 8.47 10*3/MM3 (ref 3.4–10.8)

## 2025-01-15 PROCEDURE — 72110 X-RAY EXAM L-2 SPINE 4/>VWS: CPT

## 2025-01-15 PROCEDURE — 80053 COMPREHEN METABOLIC PANEL: CPT

## 2025-01-15 PROCEDURE — 80061 LIPID PANEL: CPT

## 2025-01-15 PROCEDURE — 83036 HEMOGLOBIN GLYCOSYLATED A1C: CPT

## 2025-01-15 PROCEDURE — 85025 COMPLETE CBC W/AUTO DIFF WBC: CPT

## 2025-01-16 DIAGNOSIS — M54.16 LUMBAR RADICULOPATHY: ICD-10-CM

## 2025-01-16 DIAGNOSIS — M54.41 CHRONIC BILATERAL LOW BACK PAIN WITH RIGHT-SIDED SCIATICA: Primary | ICD-10-CM

## 2025-01-16 DIAGNOSIS — G89.29 CHRONIC BILATERAL LOW BACK PAIN WITH RIGHT-SIDED SCIATICA: Primary | ICD-10-CM

## 2025-02-03 ENCOUNTER — TELEPHONE (OUTPATIENT)
Dept: GASTROENTEROLOGY | Facility: CLINIC | Age: 70
End: 2025-02-03
Payer: MEDICARE

## 2025-02-03 DIAGNOSIS — K21.9 GASTROESOPHAGEAL REFLUX DISEASE, UNSPECIFIED WHETHER ESOPHAGITIS PRESENT: Primary | ICD-10-CM

## 2025-02-03 RX ORDER — SUCRALFATE 1 G/1
1 TABLET ORAL 4 TIMES DAILY
Qty: 120 TABLET | Refills: 3 | Status: SHIPPED | OUTPATIENT
Start: 2025-02-03

## 2025-02-03 RX ORDER — PANTOPRAZOLE SODIUM 40 MG/1
40 TABLET, DELAYED RELEASE ORAL DAILY
Qty: 90 TABLET | Refills: 1 | Status: SHIPPED | OUTPATIENT
Start: 2025-02-03

## 2025-02-03 NOTE — TELEPHONE ENCOUNTER
Caller: Pily Rice    Relationship: Self    Best call back number: [phone]    Requested Prescriptions:   Requested Prescriptions      No prescriptions requested or ordered in this encounter    CARAPHATE, PROTONIX    Pharmacy where request should be sent:  [facility] [address]      Last office visit with prescribing clinician: 9/20/2024     Next office visit with prescribing clinician: 9/22/2025     Does the patient have less than a 3 day supply:  [x] Yes  [] No    Would you like a call back once the refill request has been completed: [x] Yes [] No    If the office needs to give you a call back, can they leave a voicemail: [x] Yes [] No    Andrey Johnson Rep   02/03/25 13:24 EST

## 2025-02-03 NOTE — TELEPHONE ENCOUNTER
Medication Requested CARAFATE ; PANTOPRAZOLE    Last Refill 7/1/2024 ; 9/30/2024    Last OV 9/20/2024    Next OV 9/22/2025    Medication pended for approval and correct pharmacy verified YES

## 2025-02-20 ENCOUNTER — HOSPITAL ENCOUNTER (OUTPATIENT)
Dept: MRI IMAGING | Facility: HOSPITAL | Age: 70
Discharge: HOME | End: 2025-02-20
Admitting: FAMILY MEDICINE
Payer: MEDICARE

## 2025-02-20 DIAGNOSIS — M54.41 CHRONIC BILATERAL LOW BACK PAIN WITH RIGHT-SIDED SCIATICA: ICD-10-CM

## 2025-02-20 DIAGNOSIS — M54.16 LUMBAR RADICULOPATHY: ICD-10-CM

## 2025-02-20 DIAGNOSIS — G89.29 CHRONIC BILATERAL LOW BACK PAIN WITH RIGHT-SIDED SCIATICA: ICD-10-CM

## 2025-02-20 PROCEDURE — 72148 MRI LUMBAR SPINE W/O DYE: CPT

## 2025-02-21 DIAGNOSIS — M54.41 CHRONIC BILATERAL LOW BACK PAIN WITH RIGHT-SIDED SCIATICA: ICD-10-CM

## 2025-02-21 DIAGNOSIS — G95.89 LUMBAR EPIDURAL MASS: Primary | ICD-10-CM

## 2025-02-21 DIAGNOSIS — G89.29 CHRONIC BILATERAL LOW BACK PAIN WITH RIGHT-SIDED SCIATICA: ICD-10-CM

## 2025-02-28 ENCOUNTER — E-VISIT (OUTPATIENT)
Dept: ADMINISTRATIVE | Facility: OTHER | Age: 70
End: 2025-02-28
Payer: MEDICARE

## 2025-03-01 ENCOUNTER — E-VISIT (OUTPATIENT)
Dept: FAMILY MEDICINE CLINIC | Facility: TELEHEALTH | Age: 70
End: 2025-03-01
Payer: MEDICARE

## 2025-03-01 PROCEDURE — 87086 URINE CULTURE/COLONY COUNT: CPT

## 2025-03-01 PROCEDURE — 87088 URINE BACTERIA CULTURE: CPT

## 2025-03-01 PROCEDURE — 87186 SC STD MICRODIL/AGAR DIL: CPT

## 2025-03-01 NOTE — E-VISIT ESCALATED
Date: 2025 04:21:33  Clinician: Kailee Brand  Clinician NPI: [id number]  Patient: Pily Rice  Patient : 1955  Patient Address: [address] [place] [address]  Patient Phone: [phone]  Visit Protocol: UTI  Patient Summary:  Pily is a 69 year old ( : 1955 ) female who initiated a visit for a presumed bladder infection.    The symptoms started 1-3 days ago and consist of urgency, urinary frequency, dysuria, and feeling as if the bladder is never   empty.   Symptom details   Urine color: Yellow    Denied symptoms include vaginal itching, abdominal pain, vaginal discharge, urinary incontinence, vomiting, foul-smelling urine, nausea, and chills. Pily denies flank pain. Pily does not feel   feverish.   Pily has not used any over-the-counter medications or home remedies to relieve the current symptoms.  Precipitating events  Pily denies having a sexually transmitted infection.  Pertinent medical history  Pily has had a bladder   infection before but has not had any in the past 12 months. The current symptoms are similar to previous bladder infection symptoms.   Cephalexin (Keflex) has been effective in treating past bladder infections.   Pily does not get a yeast infection   when taking antibiotics.   Pily has not been prescribed antibiotics to prevent frequent or repeated bladder infections in the past and does not get yeast infections when taking antibiotics. Pily has not experienced problems or side effects with any   of the common antibiotics used to treat bladder infections.   Pily has not had a procedure or surgery done to the urinary tract. Pily has not been diagnosed with advanced kidney disease.   Pily has not used a catheter and denies being a patient in   a hospital or nursing home in the past 2 weeks. Pily does not have diabetes. Immunosuppressive conditions (e.g., chemotherapy, HIV, organ transplant, long-term use of steroids or other  immunosuppressive medications, splenectomy) were denied.   Pily   does not smoke or use smokeless tobacco. Pily does not vape or use other e-cigarette products.   Reason for repeat visit for the same protocol within 24 hours:  Need antibiotics for a UTI.  See the History of referred by protocol and completed visits   section for details on previous visits (visits currently in queue to be diagnosed will not appear in this section).    MEDICATIONS: pantoprazole oral, cephalexin oral, nitrofurantoin monohydrate/macrocrystals oral, pantoprazole oral, sucralfate oral, potassium chloride oral, sucralfate oral, cephalexin oral, duloxetine oral, ALLERGIES: NKDA  Clinician Response:  Dear Pily,   I am sorry you are not feeling well. To determine the most appropriate care for you, I would like you to be seen in person to further discuss your health history and symptoms.  You will not be charged for this visit.   Thank you for trusting us with your care.   Diagnosis: Refer for additional evaluation  Diagnosis ICD: R69  Additional Clinician Notes:  Please be seen in person as I feel you may have to have a urine test done along with a urine culture.

## 2025-03-01 NOTE — E-VISIT ESCALATED
Status: Referred Out  Date: 2025 21:43:29  Acuity Level: Within 24 hours  Referral message:  We're sorry you are not feeling well. Your safety is important to us. Women over the age of 50 are more likely to have gone through menopause. Following menopause, there is a decrease of a hormone called estrogen in the body. Bladder   infections can be related to the decrease of that hormone. We would like for you to be seen in person to determine the most effective treatment for you.  For the most appropriate care, please be seen:   At a clinic or urgent care  Within 24 hours    You   will not be charged for this visit. We hope you feel better soon!   Patient: Pily Rice  Patient : 1955  Patient Address: [address] [place] [address]  Patient Phone: [phone]  Clinician Response: Unavailable  Diagnosis: Unavailable  Diagnosis ICD: Unavailable     Patient Interview Questions and Responses:  Clinical Protocol: UTI  Please select the reason for your visit today.: Urinary tract infection (UTI)  When did your symptoms start?: Today  Do you have any of the following symptoms? Pain or burning when urinating: Yes  Do you have any of the following symptoms? Urinating more often than usual: Yes  Do you have any of the following symptoms? A sudden urge to urinate: Yes  Do you have any of the following symptoms? Unable to hold urine: No  Do you have any of the following symptoms? Feeling like the bladder is never empty: Yes  Do you have any of the following symptoms? Foul-smelling urine: No  What color is your urine?: Pink or red

## 2025-03-02 ENCOUNTER — PATIENT ROUNDING (BHMG ONLY) (OUTPATIENT)
Dept: URGENT CARE | Facility: CLINIC | Age: 70
End: 2025-03-02
Payer: MEDICARE

## 2025-03-02 NOTE — ED NOTES
Thank you for letting us care for you in your recent visit to our urgent care center. We would love to hear about your experience with us. Was this the first time you have visited our location?    We're always looking for ways to make our patients' experiences even better. Do you have any recommendations on ways we may improve?     I appreciate you taking the time to respond. Please be on the lookout for a survey about your recent visit from [employer] via text or email. We would greatly appreciate if you could fill that out and turn it back in. We want your voice to be heard and we value your feedback.   Thank you for choosing [facility] for your healthcare needs.

## 2025-03-02 NOTE — ED NOTES
Thank you for letting us care for you in your recent visit to our urgent care center. We would love to hear about your experience with us. Was this the first time you have visited our location?    We're always looking for ways to make our patients' experiences even better. Do you have any recommendations on ways we may improve?     I appreciate you taking the time to respond. Please be on the lookout for a survey about your recent visit from [employer] via text or email. We would greatly appreciate if you could fill that out and turn it back in. We want your voice to be heard and we value your feedback.   Thank you for choosing [facility] for your healthcare needs.

## 2025-03-17 ENCOUNTER — TELEPHONE (OUTPATIENT)
Dept: FAMILY MEDICINE CLINIC | Facility: CLINIC | Age: 70
End: 2025-03-17
Payer: MEDICARE

## 2025-03-17 DIAGNOSIS — G95.89 LUMBAR EPIDURAL MASS: ICD-10-CM

## 2025-03-17 DIAGNOSIS — G89.29 CHRONIC BILATERAL LOW BACK PAIN WITH RIGHT-SIDED SCIATICA: Primary | ICD-10-CM

## 2025-03-17 DIAGNOSIS — M54.41 CHRONIC BILATERAL LOW BACK PAIN WITH RIGHT-SIDED SCIATICA: Primary | ICD-10-CM

## 2025-03-17 NOTE — TELEPHONE ENCOUNTER
Caller: Pily Rice    Relationship: Self    Best call back number: [phone]     What orders are you requesting (i.e. lab or imaging): MRI LOWER BACK - WITH CONTRAST    Where will you receive your lab/imaging services: [facility]    Additional notes: PATIENT IS CALLING BECAUSE [facility] HAS NOT HEARD ABOUT SCHEDULING FOR THIS --    SHE DID HAVE ONE MRI LOWER BACK WITHOUT CONTRAST ON 02/20 -- AND STATES Arnaldo Thomas, DO TOLD HER ON THE 21ST THAT SHE NEEDS ONE WITH CONTRAST    PLEASE ADVISE

## 2025-03-17 NOTE — TELEPHONE ENCOUNTER
Call placed to patient with instructions to call radiology and to schedule the MRI with contrast with voiced understanding.

## 2025-03-17 NOTE — TELEPHONE ENCOUNTER
I received notification from radiology that a new order will be needed since previous MRI is over 30 days old.  MRI of the lumbar spine with and without contrast placed.  Please inform patient that she will be contacted with appointment date and time.

## 2025-04-14 ENCOUNTER — HOSPITAL ENCOUNTER (OUTPATIENT)
Dept: MRI IMAGING | Facility: HOSPITAL | Age: 70
Discharge: HOME | End: 2025-04-14
Admitting: FAMILY MEDICINE
Payer: MEDICARE

## 2025-04-14 ENCOUNTER — OFFICE VISIT (OUTPATIENT)
Dept: FAMILY MEDICINE CLINIC | Facility: CLINIC | Age: 70
End: 2025-04-14
Payer: MEDICARE

## 2025-04-14 VITALS
SYSTOLIC BLOOD PRESSURE: 124 MMHG | HEART RATE: 68 BPM | OXYGEN SATURATION: 97 % | HEIGHT: 62 IN | BODY MASS INDEX: 34.45 KG/M2 | DIASTOLIC BLOOD PRESSURE: 64 MMHG | WEIGHT: 187.2 LBS | TEMPERATURE: 98.3 F

## 2025-04-14 DIAGNOSIS — M54.41 CHRONIC BILATERAL LOW BACK PAIN WITH RIGHT-SIDED SCIATICA: ICD-10-CM

## 2025-04-14 DIAGNOSIS — G62.9 NEUROPATHY: ICD-10-CM

## 2025-04-14 DIAGNOSIS — G89.29 CHRONIC BILATERAL LOW BACK PAIN WITH RIGHT-SIDED SCIATICA: ICD-10-CM

## 2025-04-14 DIAGNOSIS — R19.7 DIARRHEA, UNSPECIFIED TYPE: ICD-10-CM

## 2025-04-14 DIAGNOSIS — M54.41 CHRONIC BILATERAL LOW BACK PAIN WITH RIGHT-SIDED SCIATICA: Primary | ICD-10-CM

## 2025-04-14 DIAGNOSIS — G95.89 LUMBAR EPIDURAL MASS: ICD-10-CM

## 2025-04-14 DIAGNOSIS — R73.03 PREDIABETES: ICD-10-CM

## 2025-04-14 DIAGNOSIS — G89.29 CHRONIC BILATERAL LOW BACK PAIN WITH RIGHT-SIDED SCIATICA: Primary | ICD-10-CM

## 2025-04-14 DIAGNOSIS — I10 PRIMARY HYPERTENSION: ICD-10-CM

## 2025-04-14 DIAGNOSIS — M54.16 LUMBAR RADICULOPATHY: ICD-10-CM

## 2025-04-14 DIAGNOSIS — Z23 NEED FOR TDAP VACCINATION: ICD-10-CM

## 2025-04-14 LAB
CREAT BLDA-MCNC: 1 MG/DL (ref 0.6–1.3)
EGFRCR SERPLBLD CKD-EPI 2021: 60.7 ML/MIN/1.73

## 2025-04-14 PROCEDURE — 72158 MRI LUMBAR SPINE W/O & W/DYE: CPT

## 2025-04-14 PROCEDURE — 82565 ASSAY OF CREATININE: CPT

## 2025-04-14 PROCEDURE — 25510000002 GADOBENATE DIMEGLUMINE 529 MG/ML SOLUTION: Performed by: FAMILY MEDICINE

## 2025-04-14 PROCEDURE — A9577 INJ MULTIHANCE: HCPCS | Performed by: FAMILY MEDICINE

## 2025-04-14 RX ADMIN — GADOBENATE DIMEGLUMINE 17 ML: 529 INJECTION, SOLUTION INTRAVENOUS at 20:03

## 2025-04-14 NOTE — PROGRESS NOTES
Chief Complaint  Follow-up and Back Pain (low)    Subjective          Pily Rice presents to [facility]    History of Present Illness     History of Present Illness  The patient is a 69-year-old female who presents today for follow-up.    She has been experiencing persistent pain radiating down her right leg, which she attributes to sciatic nerve involvement. This pain ceased a few weeks prior to her last visit in January 2025 and has not recurred since. However, she continues to experience numbness in her toes, more pronounced on the right side, and occasional sharp, transient shocks in her right foot. The severity of these symptoms does not warrant pharmacological intervention at this time. She was prescribed duloxetine in January 2025, but reports no noticeable improvement in her symptoms and has discontinued its use.    She sought urgent care for a bladder infection, which was successfully treated with Macrobid. She reports that her urine was malodorous during the infection. She occasionally experiences diarrhea, which she believes may be triggered by rice consumption. She underwent a colonoscopy on 06/19/2024, during which an ulcer was identified. The ulcer has since resolved.    She is currently on a regimen of lisinopril/hydrochlorothiazide 10/12.5 mg.    MEDICATIONS  Current: lisinopril hydrochlorothiazide 10/12.5 mg  Discontinued: duloxetine         Current Outpatient Medications   Medication Instructions    atorvastatin (LIPITOR) 10 mg, Oral, Daily    calcium carbonate (OS-ALEX) 1250 (500 Ca) MG tablet Calcium 500 500 mg calcium (1,250 mg) oral tablet take 1 tablet by oral route daily   Active    DULoxetine (CYMBALTA) 20 mg, Oral, Daily    glucosamine-chondroitin 500-400 MG capsule capsule 3 Times Daily With Meals    lisinopril-hydrochlorothiazide (PRINZIDE,ZESTORETIC) 10-12.5 MG per tablet 1 tablet, Oral, Daily    multivitamin with minerals tablet tablet multivitamin  "oral tablet take 1 tablet by oral route daily   Active    pantoprazole (PROTONIX) 40 mg, Oral, Daily    sucralfate (CARAFATE) 1 g, Oral, 4 Times Daily       The following portions of the patient's history were reviewed and updated as appropriate: allergies, current medications, past family history, past medical history, past social history, past surgical history, and problem list.    Objective   Vital Signs:   /64   Pulse 68   Temp 98.3 °F (36.8 °C) (Oral)   Ht 157.5 cm (62\")   Wt 84.9 kg (187 lb 3.2 oz)   SpO2 97%   BMI 34.24 kg/m²     BP Readings from Last 3 Encounters:   04/14/25 124/64   03/01/25 156/78   01/13/25 122/70     Wt Readings from Last 3 Encounters:   04/14/25 84.9 kg (187 lb 3.2 oz)   03/01/25 83.1 kg (183 lb 4.8 oz)   01/13/25 81 kg (178 lb 8 oz)           Physical Exam  Vitals reviewed.   Constitutional:       Appearance: Normal appearance.   HENT:      Head: Normocephalic and atraumatic.      Right Ear: External ear normal.      Left Ear: External ear normal.      Nose: Nose normal.   Eyes:      Conjunctiva/sclera: Conjunctivae normal.   Cardiovascular:      Rate and Rhythm: Normal rate and regular rhythm.      Heart sounds: No murmur heard.     No friction rub. No gallop.   Pulmonary:      Effort: Pulmonary effort is normal.      Breath sounds: Normal breath sounds. No wheezing or rhonchi.   Abdominal:      General: Bowel sounds are normal. There is no distension.      Palpations: Abdomen is soft.      Tenderness: There is no abdominal tenderness.   Musculoskeletal:      Comments: Paraspinal hypertonicity noted of the lumbar spine.  Nontender to palpation.   Skin:     General: Skin is warm and dry.   Neurological:      Mental Status: She is alert and oriented to person, place, and time.      Cranial Nerves: No cranial nerve deficit.   Psychiatric:         Mood and Affect: Mood and affect normal.         Behavior: Behavior normal.         Thought Content: Thought content normal.        "  Judgment: Judgment normal.            Result Review :   The following data was reviewed by: Arnaldo Thomas DO on 04/14/2025:  Common labs          6/4/2024    10:48 1/15/2025    13:30   Common Labs   Glucose 120  88    BUN 30  20    Creatinine 1.02  1.05    Sodium 138  145    Potassium 3.3  3.5    Chloride 98  104    Calcium 9.5  10.9    Albumin 4.1  4.2    Total Bilirubin 0.5  0.3    Alkaline Phosphatase 85  87    AST (SGOT) 23  19    ALT (SGPT) 19  13    WBC 13.13  8.47    Hemoglobin 13.7  12.6    Hematocrit 41.2  40.0    Platelets 446  411    Total Cholesterol  171    Triglycerides  149    HDL Cholesterol  52    LDL Cholesterol   93    Hemoglobin A1C  5.70             Lab Results   Component Value Date    BILIRUBINUR Negative 03/01/2025       Results  Laboratory Studies  A1c was 5.7%. E. coli present in urine culture.    Imaging  MRI shows a mass posterior to the L1 vertebral body on the left, favored to represent a benign meningioma.    Procedures        Assessment and Plan    Diagnoses and all orders for this visit:    1. Chronic bilateral low back pain with right-sided sciatica (Primary)    2. Lumbar epidural mass    3. Lumbar radiculopathy    4. Prediabetes    5. Neuropathy    6. Need for Tdap vaccination    7. Primary hypertension    8. Diarrhea, unspecified type        Assessment & Plan  1. Prediabetes.  Her A1c level is 5.7%, indicating prediabetes. The etiology of the shooting pains in her feet remains uncertain, potentially attributable to either her back condition or elevated blood glucose levels. She has no prior history of diabetes. A nerve conduction study could be considered, but its utility may be limited given the absence of clear radicular symptoms. Her B12 levels have not been previously assessed, but there is no evidence of macrocytosis or anemia on her lab report from 3 months ago. She has declined further evaluation of her B12 levels at this time. She will inform us if there are any  changes in her condition.    2. Hypertension.  Her blood pressure is well-controlled. She will continue her current regimen of lisinopril/hydrochlorothiazide 10/12.5 mg.    3. Sciatica.  She reports improvement in her sciatic pain, which has not recurred since January. She will discontinue duloxetine as it has not provided any noticeable benefit.    4. Urinary tract infection.  She was recently treated for a UTI with Macrobid, which resolved her symptoms. She is advised to continue monitoring for any recurrent symptoms and seek medical attention if necessary.    5. Health Maintenance.  She is due for a Tdap vaccine and plans to get it at the pharmacy.    I did discuss with patient ongoing issues she has periodically with diarrhea.  She notes that when she eats rice that this will cause the symptoms to flareup.  She is not interested in additional medication or evaluation at this time.  If she changes her mind she is to let us know.    PROCEDURE  Colonoscopy on 06/19/2024 identified an ulcer, which has since resolved.    We discussed a plan to follow-up depending on the results of the MRI of the lumbar spine.    Medications Discontinued During This Encounter   Medication Reason    nitrofurantoin, macrocrystal-monohydrate, (Macrobid) 100 MG capsule *Therapy completed          Follow Up   No follow-ups on file.  Patient was given instructions and counseling regarding her condition or for health maintenance advice. Please see specific information pulled into the AVS if appropriate.     Patient or patient representative verbalized consent for the use of Ambient Listening during the visit with  Arnaldo Thomas DO for chart documentation. 4/14/2025  13:39 EDT    Arnaldo Thomas DO  04/14/25  14:02 EDT

## 2025-05-15 ENCOUNTER — OFFICE VISIT (OUTPATIENT)
Dept: NEUROSURGERY | Facility: CLINIC | Age: 70
End: 2025-05-15
Payer: MEDICARE

## 2025-05-15 VITALS
BODY MASS INDEX: 33.34 KG/M2 | SYSTOLIC BLOOD PRESSURE: 131 MMHG | WEIGHT: 181.2 LBS | DIASTOLIC BLOOD PRESSURE: 82 MMHG | HEIGHT: 62 IN

## 2025-05-15 DIAGNOSIS — D49.7 INTRADURAL EXTRAMEDULLARY SPINAL TUMOR: Primary | ICD-10-CM

## 2025-05-15 DIAGNOSIS — M54.31 SCIATICA OF RIGHT SIDE: ICD-10-CM

## 2025-05-15 DIAGNOSIS — M54.41 CHRONIC MIDLINE LOW BACK PAIN WITH RIGHT-SIDED SCIATICA: ICD-10-CM

## 2025-05-15 DIAGNOSIS — G89.29 CHRONIC MIDLINE LOW BACK PAIN WITH RIGHT-SIDED SCIATICA: ICD-10-CM

## 2025-05-15 NOTE — PROGRESS NOTES
"Chief Complaint  Back Pain    Subjective            Pily Rice who is a 69 y.o. year old female who presents to [facility] for Evaluation of the Spine.     History of Present Illness  The patient is a 69-year-old female who presents for evaluation of lower back pain.    She has been experiencing severe lower back pain for several decades, which is exacerbated by standing, walking, or lifting objects. The pain is alleviated when she is seated. She reports no recent MRIs of her lower back within the past 5 years. She was informed that she has severe degenerative disc disease, which she believes explains her chronic back pain. She expresses relief that her current back pain is manageable, but she found the sciatic nerve pain intolerable during its occurrence last fall. She also reports numbness in her toes, more pronounced in her right foot than her left, but does not experience any radiating leg pain.    Additionally, she has experienced intermittent episodes of severe sciatic pain radiating down her right leg, with the most recent episode occurring from 09/2024 to early 01/2025. This episode was particularly severe, lasting approximately 3 to 4 months. She recalls a similar episode approximately 10 years ago, but it was less severe than the most recent one. She had a CT scan of her abdomen and pelvis in 06/2024, which did not reveal any abnormalities in her spine.    This patient  reports that she has never smoked. She has never been exposed to tobacco smoke. She has never used smokeless tobacco.    Review of Systems   Musculoskeletal:  Positive for back pain.        Objective   Vital Signs:   /82 (BP Location: Left arm, Patient Position: Sitting)   Ht 157.5 cm (62\")   Wt 82.2 kg (181 lb 3.2 oz)   BMI 33.14 kg/m²       Physical Exam  Constitutional:       Comments: BMI 33.14   Pulmonary:      Effort: Pulmonary effort is normal.   Neurological:      Mental " Status: She is alert.      Sensory: No sensory deficit.      Motor: No weakness.   Psychiatric:         Mood and Affect: Mood normal.          Result Review :   I personally interpreted the patient's MRI scan with the patient.     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 Significant arthritis and severe degenerative disc disease with almost complete disc space collapse.   - CT scan of the abdomen and pelvis: 06/2024, Calcified lesion at L1 (13 mm).         Assessment and Plan    Diagnoses and all orders for this visit:    1. Intradural extramedullary spinal tumor (Primary)    2. Chronic midline low back pain with right-sided sciatica    3. Sciatica of right side-now resolved      Assessment & Plan  1. Lower back pain.  The patient has a long history of lower back pain, which is exacerbated by standing, walking, and lifting. The MRI revealed significant arthritis and severe degenerative disc disease with almost complete disc space collapse. There is no definitive fix for her multilevel severe degeneration. The patient was advised to monitor for any new symptoms such as leg weakness or numbness and to report any changes promptly.    2. Sciatica.  The patient experienced severe sciatic pain shooting down her right leg last fall, which lasted for 3 to 4 months. The pain has since resolved. The MRI showed a benign intradural extramedullary tumor on the left side, which is unlikely to be the cause of her right-sided sciatica. She was advised to watch for any recurrence of sciatic symptoms, especially if they affect her left leg, and to report any new symptoms immediately.    3. Intradural extramedullary tumor.  The MRI revealed a benign intradural extramedullary tumor about half an inch in diameter, located inside the spinal canal but outside the spinal cord. The tumor is likely calcified and  slow-growing. The patient was reassured that the tumor is benign and may not require removal given her age. An MRI with and without contrast will be scheduled in 1 year to monitor the tumor's size. If there are any new symptoms such as severe left leg pain, the MRI will be conducted sooner.    Follow-up  A follow-up appointment is scheduled in 1 year, or sooner if any issues arise.       Follow Up   Return in about 1 year (around 5/15/2026).  Patient was given instructions and counseling regarding her condition or for health maintenance advice. Please see specific information pulled into the AVS if appropriate.     Patient or patient representative verbalized consent for the use of Ambient Listening during the visit with  Brandon Pastrana MD for chart documentation. 5/15/2025  09:16 EDT

## 2025-08-07 ENCOUNTER — HOSPITAL ENCOUNTER (OUTPATIENT)
Dept: MAMMOGRAPHY | Facility: HOSPITAL | Age: 70
Discharge: HOME | End: 2025-08-07
Admitting: FAMILY MEDICINE
Payer: MEDICARE

## 2025-08-07 DIAGNOSIS — Z12.31 VISIT FOR SCREENING MAMMOGRAM: ICD-10-CM

## 2025-08-07 PROCEDURE — 77063 BREAST TOMOSYNTHESIS BI: CPT

## 2025-08-07 PROCEDURE — 77067 SCR MAMMO BI INCL CAD: CPT

## 2025-08-27 ENCOUNTER — TELEPHONE (OUTPATIENT)
Dept: GASTROENTEROLOGY | Facility: CLINIC | Age: 70
End: 2025-08-27
Payer: MEDICARE

## (undated) DEVICE — CONN JET HYDRA H20 AUXILIARY DISP

## (undated) DEVICE — SOLIDIFIER LIQLOC PLS 1500CC BT

## (undated) DEVICE — SOL IRRG H2O PL/BG 1000ML STRL

## (undated) DEVICE — Device: Brand: DEFENDO AIR/WATER/SUCTION AND BIOPSY VALVE

## (undated) DEVICE — SINGLE-USE BIOPSY FORCEPS: Brand: RADIAL JAW 4

## (undated) DEVICE — Device

## (undated) DEVICE — BLCK/BITE BLOX WO/DENTL/RIM W/STRAP 54F

## (undated) DEVICE — LINER SURG CANSTR SXN S/RIGD 1500CC